# Patient Record
Sex: MALE | Race: WHITE | Employment: OTHER | URBAN - METROPOLITAN AREA
[De-identification: names, ages, dates, MRNs, and addresses within clinical notes are randomized per-mention and may not be internally consistent; named-entity substitution may affect disease eponyms.]

---

## 2020-03-04 ENCOUNTER — HOSPITAL ENCOUNTER (OUTPATIENT)
Dept: PHYSICAL THERAPY | Age: 55
Discharge: HOME | End: 2020-03-04
Payer: OTHER GOVERNMENT

## 2020-03-04 PROCEDURE — 97161 PT EVAL LOW COMPLEX 20 MIN: CPT

## 2020-03-04 PROCEDURE — 97140 MANUAL THERAPY 1/> REGIONS: CPT

## 2020-03-04 PROCEDURE — 97110 THERAPEUTIC EXERCISES: CPT

## 2020-03-04 NOTE — PROGRESS NOTES
Giovani Car  : 1965  Primary: [facility]  Secondary:  Therapy Center at [address], [place], [address] [place] [address]  [phone]   [phone]      OUTPATIENT PHYSICAL THERAPY: Daily Treatment Note 3/4/2020  ICD-10: Treatment Diagnosis:   Lumbago with Sciatica Right side (M54.41)  Pain in left hip (M25.552)  Pain in right hip (M25.551)      Precautions: Congenital dyplasia bilateral hips  Allergies: Patient has no known allergies. TREATMENT PLAN:  Effective Dates: 3/4/2020 TO 5/3/2020 (60 days). 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.89]  Pain in right hip [M25.551]  Pain in left hip [M25.552]   DATE OF ONSET: Progressive symptoms for 20 years, worst the last year  REFERRING PHYSICIAN: Stephen Sage MD MD Orders: Evaluate and Treat   Return MD Appointment: Not scheduled       Pre-treatment Symptoms/Complaints:  Mr. Amos Juarez is a 49 y.o. male presenting to physical therapy with complaints of low back and bilateral hip pain right greater than left. Patient states that pain is limiting activity in all positions. Pain: Initial: Pain Intensity 1: 9  Pain Location 1: Back, Hip  Pain Orientation 1: Right, Lower, Left  Pain Intervention(s) 1: Exercise  Post Session:  3/10   Medications Last Reviewed:  3/4/2020  Updated Objective Findings: See Initial Eval for more details. TREATMENT:   THERAPEUTIC EXERCISE: (15 minutes):  Exercises per grid below to improve mobility, strength, balance, and dynamic movement of back - generalized to improve functional bending, lifting, carrying, reaching, and overhead activites. Required moderate visual, verbal, and tactile cues to promote proper body alignment, promote proper body posture, promote proper body mechanics, and promote proper body breathing techniques.   Progressed resistance, range, repetitions, and complexity of movement as indicated. Date:  3/4/2020 Date:   Date:     Activity/Exercise Parameters Parameters Parameters   HEP HEP, POC, PT goals, anatomy/pathology     Nustep      Calf stretch wedge      Lumbar rotation      Hamstring stretch [id number]     Piriformis stretch [id number]     IT Band stretch [id number]     Hip ER stretch [id number]     TA activation [id number]                   Time spent with patient on correct form and muscle recruitment. MANUAL THERAPY: (8 minutes): Joint mobilization, Soft tissue mobilization was utilized and necessary because of the patient's restricted joint motion and restricted motion of soft tissue mobility. Date  3/4/2020    Technique Used Grade  Level # Time(s) Effect while being performed   Central posterior anterior III IV L2-5 3x6 each Mild tenderness          Muscle energy technique  For anterior rotation right innominate    Improved gait and decreased pain       MODALITIES: (0 minutes):      None Today     HEP: As above; handouts given to patient for all exercises. Treatment/Session Summary:    · Response to Treatment:  Pt demonstrated understanding of POC and initial HEP. No increase in pain or adverse reactions. · Communication/Consultation:  Patient was instructed in HEP, Plan of care, Physical Therapy goals, anatomy/pathology  · Equipment provided today:  None today  · Recommendations/Intent for next treatment session: Next visit will focus on Manual Therapy Core Stability Quad strengthening Hip strengthening soft tissue mobilization.     Total Treatment Billable Duration:  48 minutes: 25 evaluation, 15 Therapeutic exercise, 8 Manual Therapy   PT Patient Time In/Time Out  Time In: 1104  Time Out: 124 [facility], PT

## 2020-03-04 NOTE — THERAPY EVALUATION
Giovani Car  : 1965  Primary: [facility]  Secondary:  Therapy Center at [address], [address] [place] [address] Street  [phone]   [phone]          OUTPATIENT PHYSICAL THERAPY:Initial Assessment 3/4/2020      ICD-10: Treatment Diagnosis:   Lumbago with Sciatica Right side (M54.41)  Pain in left hip (M25.552)  Pain in right hip (M25.551)         Precautions: Congenital dyplasia bilateral hips  Allergies: Patient has no known allergies. TREATMENT PLAN:  Effective Dates: 3/4/2020 TO 5/3/2020 (60 days). 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.89]  Pain in right hip [M25.551]  Pain in left hip [M25.552]   DATE OF ONSET: Progressive symptoms for 20 years, worst the last year  REFERRING PHYSICIAN: Stephen Sage MD MD Orders: Evaluate and Treat   Return MD Appointment: Not scheduled     INITIAL ASSESSMENT:  Mr. Amos Juarez is a 49 y.o. male presenting to physical therapy with complaints of low back and bilateral hip pain right greater than left. Patient states that pain is limiting activity in all positions. Patient is motivated to decrease pain and improve overall mobility. Patient presents with increased pain, decreased strength, decreased ROM, decreased flexibility, decreased activity tolerance, and overall impaired functional mobility. Patient is a good candidate for skilled intervention with services to include manual therapy, modalities as needed, therapeutic exercises, and activity modification. PROBLEM LIST (Impacting functional limitations):  1. Decreased Strength  2. Decreased ADL/Functional Activities  3. Decreased Transfer Abilities  4. Decreased Ambulation Ability/Technique  5. Decreased Balance  6. Increased Pain  7. Decreased Activity Tolerance  8. Increased Fatigue  9.  Increased Shortness of Breath  10. Decreased Flexibility/Joint Mobility  11. Decreased [place] with Home Exercise Program INTERVENTIONS PLANNED:  1. Balance Exercise  2. Bed Mobility  3. Cold  4. Cryotherapy  5. Electrical Stimulation  6. Family Education  7. Gait Training  8. Heat  9. Home Exercise Program (HEP)  10. Manual Therapy  11. Neuromuscular Re-education/Strengthening  12. Range of Motion (ROM)  13. Therapeutic Activites  14. Therapeutic Exercise/Strengthening  15. Transfer Training  16. Lumbar Traction     GOALS: (Goals have been discussed and agreed upon with patient.)     Short-Term Functional Goals: Time Frame: 3/4/2020 to 4/3/2020 (30 days)  Goal Met   1. Dannie Alcazar will be independent with HEP 1.  [] Date:   2. Dannie Alcazar will participate in LE stretching program to increase flexibility    2. [] Date:   3. Dannie Alcazar will participate in core stabilization exercises to help with stabilization during ADLs 3. [] Date:   4. Dannie Alcazar will participate in LE strengthening program with weights/resistance as appropriate to help with gait and elevations 4. [] Date:   5. Dannie Alcazar will be educated in and demonstrate proper squat lift technique in order to reduce stress on spine and pelvis, improve safety, and reduce risk of injury. 5.  [] Date:   6. Dannie Alcazar will tolerate manual therapy/joint mobilizations/soft tissue to increase ROM and decrease pain  6. [] Date:              Discharge Goals: Time Frame: 3/4/2020 to 5/3/2020 (60 days) Goal Met   1. Dannie Alcazar will improve pain to 1-2/10 for improved tolerance to sleeping greater than 6 hours 1. [] Date:   2. Dannie Alcazar will demonstrate 4+ to5/5 LE strength on manual muscle testing for ability to perform yard work required around the home 2. [] Date:   3. Dannie Alcazar will improve lumbar flexion ROM to 70 degrees for increased ability to perform bending, lifting and squating 3.   [] Date:   Willi Orozco Bartolo Ledesma will improve Modified Oswestry Low Back Pain Questionnaire to 10/50 to demonstrate increased tolerance to activity. 4.  [] Date:          Outcome Measure: Tool Used: Modified Oswestry Low Back Pain Questionnaire  Score:  Initial: 22/50  Most Recent: X/50 (Date: -- )   Interpretation of Score: Each section is scored on a 0-5 scale, 5 representing the greatest disability. The scores of each section are added together for a total score of 50. Medical Necessity:   ·    Skilled intervention continues to be required due to above deficits affecting participation in basic ADLs and overall functional tolerance. · Patient demonstrates capacity to improve ROM and strength which will increase independence, increase safety, and allow for return to previous activity level. · Patient demonstrates excellent rehab potential due to higher previous functional level. Reason for Services/Other Comments:  · Patient requires skilled intervention due to modification of therapeutic interventions to increase complexity of exercises . Total Evaluation Duration: 25 minutes     Rehabilitation Potential For Stated Goals: Excellent  Regarding [address] [place] [address] Road therapy, I certify that the treatment plan above will be carried out by a therapist or under their direction. Thank you for this referral,  Thomas Waggoner, PT MSR  Referring Physician Signature: Katie Lynch MD              Date                    HISTORY:   History of Present Injury/Illness (Reason for Referral):    Mr. Alysia Beyer is a 49 y.o. male presenting to physical therapy with complaints of low back and bilateral hip pain right greater than left.  Patient states that pain is limiting activity in all positions.     -Present symptoms/complaints (on day of evaluation)  Pain Scale:  · Current: 6/10  · Best: 4/10  · Worst: 9/10      · Aggravating factors: Standing, Walking, sitting, bending, Lifting and Sleeping   · Relieving factors: Rest and Meds  · Irritability: Medium (Onset of pain is equal to alleviation)      Past Medical History/Comorbidities:   Mr. Evelia Baker  has a past medical history of Arthritis, Cancer ([facility] [address] and Unspecified sleep apnea. Mr. Evelia Baker  has a past surgical history that includes hx orthopaedic (2002). 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 Patient denies any saddle paresthesia or bowel/bladder deficits. Current Medications:    Current Outpatient Medications:     HYDROcodone-acetaminophen (LORTAB) 7.5-500 mg per tablet, Take 1 Tab by mouth every four (4) hours as needed for Pain., Disp: 25 Tab, Rfl: 0      Ambulatory/Rehab Services H2 Model Falls Risk Assessment    Risk Factors:       (1)  Gender [Male] Ability to Rise from Chair:       (1)  Pushes up, successful in one attempt    Falls Prevention Plan:       No modifications necessary   Total: (5 or greater = High Risk): 2    ©2010 [facility] [employer]. All Rights Reserved. [facility] States Patent #[id number].  Federal Law prohibits the replication, distribution or use without written permission from [facility] [employer]       Date Last Reviewed:  3/4/2020   Number of Personal Factors/Comorbidities that affect the Plan of Care: 0: LOW COMPLEXITY   EXAMINATION:   Observation/Orthostatic Postural Assessment:           Decreased Lumbar Lordosis and Right Lateral Shift  Palpation:          Increased Tenderness with mild palpation of  Spinous process L2-5 and anterior right hip     ROM:            AROM/PROM         Joint: Eval Date: 3/4/2020  Re-Assess Date:  Re-Assess Date:    Active ROM RIGHT LEFT RIGHT LEFT RIGHT LEFT   Knee Extension WNL WNL       Knee Flexion WNL WNL       Hip Flexion 93 105       Hip Abduction 25 34       Hip External rotation 35 55       Hip Internal rotation 5 18       Lumbar Flexion 50        Lumbar Extension 13          Repeated Motion:  Direction    Frequency Symptoms Prior Symptons Post   Flexion Repeated 10 times  Reproduced Symptoms   Extension Repeated 10 times  Reproduced Symptoms         Strength:     Eval Date: 3/4/2020  Re-Assess Date:  Re-Assess Date:      RIGHT LEFT RIGHT LEFT RIGHT LEFT   Knee Flexion (L5-S2)   4+/5  5/5           Knee Extension (L3, L4)  5/5  5/5           Hip Flexion (L1, L2)  4/5  4+/5           Hip Extension  4/5  4+/5           Hip Abduction (L5, S1)  4/5  4+/5           Ankle Dorsiflexion (L4)  5/5  5/5           Great Toe Extension (L5)  5/5  5/5           Ankle Plantar Flexion (S1-S2)  5/5  5/5               Special Tests:  SLR: Negative  SLUMP: Negative  Scouring:Positive for impingement pain  Fabers:Positive        Manual:   Eval Date: 3/4/2020   Reasess Date:      Joint/Area Directon Grade Treatment Effect Joint Direction Grade Treatment Effect   Lumbar 2-5 PA III and IV Improved Symptoms post treatment           Right hip Distraction III Improved Symptoms post treatment                           Neurological Screen:    RADIATING SYMPTOMS?: No                  Reflex Testing:      Upper motor [place] screen         Clonus: Negative         Babinski: Negative    Functional Mobility:  Affecting participation in basic ADLs and functional tasks.     Balance:     Single Leg Stance: R= <15 seconds L= <30 seconds     Body Structures Involved:  1. Bones  2. Joints  3. Muscles  4. Ligaments Body Functions Affected:  1. Sensory/Pain  2. Neuromusculoskeletal  3. Movement Related Activities and Participation Affected:  1. Mobility  2.  Self Care   Number of elements that affect the Plan of Care: 1-2: LOW COMPLEXITY   CLINICAL PRESENTATION:   Presentation: Stable and uncomplicated: LOW COMPLEXITY   CLINICAL DECISION MAKING:      Use of outcome tool(s) and clinical judgement create a POC that gives a: Clear prediction of patient's progress: LOW COMPLEXITY     See associated treatment note for treatment provided today      Yasmeen Robles PT

## 2020-03-05 ENCOUNTER — HOSPITAL ENCOUNTER (OUTPATIENT)
Dept: PHYSICAL THERAPY | Age: 55
Discharge: HOME | End: 2020-03-05
Payer: OTHER GOVERNMENT

## 2020-03-05 PROCEDURE — 97014 ELECTRIC STIMULATION THERAPY: CPT

## 2020-03-05 PROCEDURE — 97140 MANUAL THERAPY 1/> REGIONS: CPT

## 2020-03-05 PROCEDURE — 97110 THERAPEUTIC EXERCISES: CPT

## 2020-03-05 NOTE — PROGRESS NOTES
Rosi Forman  : 1965  Primary: [facility]  Secondary:  Therapy Center at [address], [place], [address] [place] [address]  [phone]   [phone]      OUTPATIENT PHYSICAL THERAPY: Daily Treatment Note 3/5/2020  ICD-10: Treatment Diagnosis:   Lumbago with Sciatica Right side (M54.41)  Pain in left hip (M25.552)  Pain in right hip (M25.551)      Precautions: Congenital dyplasia bilateral hips  Allergies: Patient has no known allergies. TREATMENT PLAN:  Effective Dates: 3/4/2020 TO 5/3/2020 (60 days). 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.89]  Pain in right hip [M25.551]  Pain in left hip [M25.552]   DATE OF ONSET: Progressive symptoms for 20 years, worst the last year  REFERRING PHYSICIAN: David Dior MD MD Orders: Evaluate and Treat   Return MD Appointment: Not scheduled       Pre-treatment Symptoms/Complaints:  Pt. continues to complain of bilateral low back pain that radiates down both LE's    Pain: Initial: Pain Intensity 1: 5  Pain Location 1: Back, Hip  Pain Orientation 1: Left, Lower, Right  Pain Intervention(s) 1: Exercise  Post Session:  0 /10 no pain   Medications Last Reviewed:  3/5/2020  Updated Objective Findings: Pt. presented moderate antalgic gait entering clinic today. TREATMENT:   THERAPEUTIC EXERCISE: (30 minutes):  Exercises per grid below to improve mobility, strength, balance, and dynamic movement of back - generalized to improve functional bending, lifting, carrying, reaching, and overhead activites. Required moderate visual, verbal, and tactile cues to promote proper body alignment, promote proper body posture, promote proper body mechanics, and promote proper body breathing techniques. Progressed resistance, range, repetitions, and complexity of movement as indicated. Date:  3/4/2020 Date:  3/5/20  Date:     Activity/Exercise Parameters Parameters Parameters   HEP HEP, POC, PT goals, anatomy/pathology     Nustep  X 10 mins level 5    Calf stretch wedge  4x30 sec hold     Lumbar rotation  X 10 reps x 10 sec hold     Hamstring stretch [id number] Strap 4x30 sec hold     Piriformis stretch [id number] 4x30 sec hold     IT Band stretch [id number] Strap 4x30 sec     Hip ER stretch [id number]     TA activation [id number]     SKTC  4x30 sec hold BLE            Time spent with patient on correct form and muscle recruitment. MANUAL THERAPY: (15 minutes): Joint mobilization, Soft tissue mobilization was utilized and necessary because of the patient's restricted joint motion and restricted motion of soft tissue mobility. Pt. Received soft tissue mobilization to decrease pain and tightness in bilateral lumbosacral paraspinals in prone. Date  3/5/2020    Technique Used Grade  Level # Time(s) Effect while being performed   Central posterior anterior III IV L2-5 3x6 each Mild tenderness          Muscle energy technique  For anterior rotation right innominate    Improved gait and decreased pain       MODALITIES: (15 minutes):      Pt. received IFC estim and moist hot pack to bilateral lumbosacral paraspinals in prone x 15 mins to decrease pain and muscle tightness. HEP: As above; handouts given to patient for all exercises. Treatment/Session Summary:    · Response to Treatment:  Pt. was compliant with all exercises and reported no pain at the end of session today. · Communication/Consultation:  Patient was instructed in HEP, Plan of care, Physical Therapy goals, anatomy/pathology  · Equipment provided today:  None today  · Recommendations/Intent for next treatment session: Next visit will focus on Manual Therapy Core Stability Quad strengthening Hip strengthening soft tissue mobilization.     Total Treatment Billable Duration: 60 mins   PT Patient Time In/Time Out  Time In: 1330  Time Out: [address]

## 2020-03-09 ENCOUNTER — HOSPITAL ENCOUNTER (OUTPATIENT)
Dept: PHYSICAL THERAPY | Age: 55
Discharge: HOME | End: 2020-03-09
Payer: OTHER GOVERNMENT

## 2020-03-09 PROCEDURE — 97110 THERAPEUTIC EXERCISES: CPT

## 2020-03-09 PROCEDURE — 97014 ELECTRIC STIMULATION THERAPY: CPT

## 2020-03-09 PROCEDURE — 97140 MANUAL THERAPY 1/> REGIONS: CPT

## 2020-03-09 NOTE — PROGRESS NOTES
Charmian Saint  : 1965  Primary: [facility]  Secondary:  Therapy Center at [address], [place], [address] [place] [address]  [phone]   [phone]      OUTPATIENT PHYSICAL THERAPY: Daily Treatment Note 3/9/2020  ICD-10: Treatment Diagnosis:   Lumbago with Sciatica Right side (M54.41)  Pain in left hip (M25.552)  Pain in right hip (M25.551)      Precautions: Congenital dyplasia bilateral hips  Allergies: Patient has no known allergies. TREATMENT PLAN:  Effective Dates: 3/4/2020 TO 5/3/2020 (60 days). 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.89]  Pain in right hip [M25.551]  Pain in left hip [M25.552]   DATE OF ONSET: Progressive symptoms for 20 years, worst the last year  REFERRING PHYSICIAN: Fartun Hermosillo MD MD Orders: Evaluate and Treat   Return MD Appointment: Not scheduled       Pre-treatment Symptoms/Complaints:  Pt. reported bilateral low back and hip pain that was primarily in his left hip today    Pain: Initial: Pain Intensity 1: 5  Pain Location 1: Back, Hip  Pain Orientation 1: Lower, Left, Right  Pain Intervention(s) 1: Cold pack, Exercise  Post Session:  0 /10 no pain   Medications Last Reviewed:  3/9/2020  Updated Objective Findings: Pt. continues to ambulate with moderate antalgic gait   TREATMENT:   THERAPEUTIC EXERCISE: (30 minutes):  Exercises per grid below to improve mobility, strength, balance, and dynamic movement of back - generalized to improve functional bending, lifting, carrying, reaching, and overhead activites. Required moderate visual, verbal, and tactile cues to promote proper body alignment, promote proper body posture, promote proper body mechanics, and promote proper body breathing techniques. Progressed resistance, range, repetitions, and complexity of movement as indicated. Date:  3/4/2020 Date:  3/5/20  Date:  3/9/20   Activity/Exercise Parameters Parameters Parameters   Nustep  X 10 mins level 5 X 10 mins level 6    Calf stretch wedge  4x30 sec hold  [id number] hold    Lumbar rotation  X 10 reps x 10 sec hold  X 10 reps x 10 sec hold    Hamstring stretch [id number] Strap 4x30 sec hold  Strap 4x30 sec hold BLE's    Piriformis stretch [id number] 4x30 sec hold  4x30 sec hold    IT Band stretch [id number] Strap 4x30 sec  Strap 4x30 sec hol    Hip ER stretch [id number]  3x30 sec hold    TA activation [id number]  15x5 sec hold    SKTC  4x30 sec hold BLE 4x30 sec hold BLE's            Time spent with patient on correct form and muscle recruitment. MANUAL THERAPY: (15 minutes): Joint mobilization, Soft tissue mobilization was utilized and necessary because of the patient's restricted joint motion and restricted motion of soft tissue mobility. Pt. Received soft tissue mobilization to decrease pain and tightness in bilateral lumbosacral paraspinals in prone. Date  3/9/2020    Technique Used Grade  Level # Time(s) Effect while being performed   Central posterior anterior III IV L2-5 3x6 each Mild tenderness          Muscle energy technique  For anterior rotation right innominate    Improved gait and decreased pain       MODALITIES: (15 minutes):      Pt. received IFC estim and moist hot pack to bilateral lumbosacral paraspinals in prone x 15 mins to decrease pain and muscle tightness. HEP: As above; handouts given to patient for all exercises. Treatment/Session Summary:    · Response to Treatment:  Pt. was compliant with all exercises and reported no pain at the end of session today.   · Communication/Consultation:  Patient was instructed in HEP, Plan of care, Physical Therapy goals, anatomy/pathology  · Equipment provided today:  None today  · Recommendations/Intent for next treatment session: Next visit will focus on Manual Therapy Core Stability Quad strengthening Hip strengthening soft tissue mobilization.     Total Treatment Billable Duration: 60 mins   PT Patient Time In/Time Out  Time In: 1330  Time Out: Melidae [address], PTA

## 2020-03-12 ENCOUNTER — HOSPITAL ENCOUNTER (OUTPATIENT)
Dept: PHYSICAL THERAPY | Age: 55
Discharge: HOME | End: 2020-03-12
Payer: OTHER GOVERNMENT

## 2020-03-12 PROCEDURE — 97140 MANUAL THERAPY 1/> REGIONS: CPT

## 2020-03-12 PROCEDURE — 97110 THERAPEUTIC EXERCISES: CPT

## 2020-03-12 NOTE — PROGRESS NOTES
Jaelyn Cabral  : 1965  Primary: [facility]  Secondary:  [facility] at [address], [address]  [phone]   [phone]      OUTPATIENT PHYSICAL THERAPY: Daily Treatment Note 3/12/2020  ICD-10: Treatment Diagnosis:   Lumbago with Sciatica Right side (M54.41)  Pain in left hip (M25.552)  Pain in right hip (M25.551)      Precautions: Congenital dyplasia bilateral hips  Allergies: Patient has no known allergies. TREATMENT PLAN:  Effective Dates: 3/4/2020 TO 5/3/2020 (60 days). 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.89]  Pain in right hip [M25.551]  Pain in left hip [M25.552]   DATE OF ONSET: Progressive symptoms for 20 years, worst the last year  REFERRING PHYSICIAN: King Jose MD MD Orders: Evaluate and Treat   Return MD Appointment: Not scheduled       Pre-treatment Symptoms/Complaints:  Patient reports achy pain across low back and bilateral hips    Pain: Initial: Pain Intensity 1: 6  Pain Location 1: Back, Hip  Pain Orientation 1: Lower, Right, Left  Pain Intervention(s) 1: Exercise  Post Session:  0 /10 no pain   Medications Last Reviewed:  3/12/2020  Updated Objective Findings: Anterior rotation right innominate   TREATMENT:   THERAPEUTIC EXERCISE: (40 minutes):  Exercises per grid below to improve mobility, strength, balance, and dynamic movement of back - generalized to improve functional bending, lifting, carrying, reaching, and overhead activites. Required moderate visual, verbal, and tactile cues to promote proper body alignment, promote proper body posture, promote proper body mechanics, and promote proper body breathing techniques. Progressed resistance, range, repetitions, and complexity of movement as indicated.      Date:  3/12/2020 Date:  3/5/20  Date:  3/9/20 Activity/Exercise Parameters Parameters Parameters   Nustep X 10 mins level 5 X 10 mins level 5 X 10 mins level 6    Calf stretch wedge 4x30 sec hold 4x30 sec hold  [id number] hold    Lumbar rotation X 10 reps x 10 sec hold X 10 reps x 10 sec hold  X 10 reps x 10 sec hold    Hamstring stretch [id number] Strap 4x30 sec hold  Strap 4x30 sec hold BLE's    Piriformis stretch 8b78hbo 4x30 sec hold  4x30 sec hold    IT Band stretch 6f13ycx strap Strap 4x30 sec  Strap 4x30 sec hol    Hip ER stretch 6t93lve  3x30 sec hold    TA activation [id number]  15x5 sec hold    SKTC  4x30 sec hold BLE 4x30 sec hold BLE's    Bridging with abduction 3x10 green     Hooklying Adduction [id number]      Clam  Right left 2x10 green     Sidelying Abduction  Right left 2x10 green     Prone extension  Right left 2x10 green       Time spent with patient on correct form and muscle recruitment. MANUAL THERAPY: (13 minutes): Joint mobilization, Soft tissue mobilization was utilized and necessary because of the patient's restricted joint motion and restricted motion of soft tissue mobility. Pt. Received soft tissue mobilization to decrease pain and tightness in bilateral lumbosacral paraspinals in prone. Date  3/12/2020    Technique Used Grade  Level # Time(s) Effect while being performed   Central posterior anterior III IV L2-5 3x6 each Mild tenderness   Rotation   Lumbar 4min    Muscle energy technique  For anterior rotation right innominate   4 min Improved gait and decreased pain       MODALITIES: ( 5 minutes):      *  Cold Pack Therapy in order to provide analgesia. No charge     HEP: As above; handouts given to patient for all exercises. Treatment/Session Summary:    · Response to Treatment:  Patient reported less pain and improved mobility post treatment, equal pelvic landmarks.   · Communication/Consultation:  None Today  · Equipment provided today:  None today  · Recommendations/Intent for next treatment session: Next visit will focus on Manual Therapy Core Stability Quad strengthening Hip strengthening soft tissue mobilization.     Total Treatment Billable Duration: 53 mins   PT Patient Time In/Time Out  Time In: 0703  Time Out: 1090 [facility],

## 2020-03-17 ENCOUNTER — HOSPITAL ENCOUNTER (OUTPATIENT)
Dept: PHYSICAL THERAPY | Age: 55
Discharge: HOME | End: 2020-03-17
Payer: OTHER GOVERNMENT

## 2020-03-17 PROCEDURE — 97110 THERAPEUTIC EXERCISES: CPT

## 2020-03-17 NOTE — PROGRESS NOTES
Coty Arellano  : 1965  Primary: [facility]  Secondary:  [facility]rapy [facility] at [address], [address] [place] [address]  [phone]   [phone]      OUTPATIENT PHYSICAL THERAPY: Daily Treatment Note 3/17/2020  ICD-10: Treatment Diagnosis:   Lumbago with Sciatica Right side (M54.41)  Pain in left hip (M25.552)  Pain in right hip (M25.551)      Precautions: Congenital dyplasia bilateral hips  Allergies: Patient has no known allergies. TREATMENT PLAN:  Effective Dates: 3/4/2020 TO 5/3/2020 (60 days). 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.89]  Pain in right hip [M25.551]  Pain in left hip [M25.552]   DATE OF ONSET: Progressive symptoms for 20 years, worst the last year  REFERRING PHYSICIAN: Roberto Gutiérrez MD MD Orders: Evaluate and Treat   Return MD Appointment: Not scheduled       Pre-treatment Symptoms/Complaints:  Patient reports improved stability and less AM pain    Pain: Initial: Pain Intensity 1: 4  Pain Location 1: Back, Hip  Pain Orientation 1: Lower, Right, Left  Pain Intervention(s) 1: Exercise  Post Session:  0 /10 no pain   Medications Last Reviewed:  3/17/2020  Updated Objective Findings: Equal pelvic landmarks   TREATMENT:   THERAPEUTIC EXERCISE: (53 minutes):  Exercises per grid below to improve mobility, strength, balance, and dynamic movement of back - generalized to improve functional bending, lifting, carrying, reaching, and overhead activites. Required moderate visual, verbal, and tactile cues to promote proper body alignment, promote proper body posture, promote proper body mechanics, and promote proper body breathing techniques. Progressed resistance, range, repetitions, and complexity of movement as indicated.      Date:  3/12/2020 Date:  3/17/20  Date:  3/9/20   Activity/Exercise Parameters Parameters Parameters   Nustep X 10 mins level 5 X 10 mins level 5 X 10 mins level 6    Calf stretch wedge 4x30 sec hold 4x30 sec hold  5x98gwc hold    Lumbar rotation X 10 reps x 10 sec hold X 10 reps x 10 sec hold  X 10 reps x 10 sec hold    Hamstring stretch [id number] Strap 4x30 sec hold  Strap 4x30 sec hold BLE's    Piriformis stretch 5w20mly 4x30 sec hold  4x30 sec hold    IT Band stretch 3p72dgh strap Strap 4x30 sec  Strap 4x30 sec hol    Hip ER stretch 7u45oji  3x30 sec hold    TA activation 20w2mca 64n0kyt 15x5 sec hold    SKTC   4x30 sec hold BLE's    Sit to Stand   With pad 3x10    3 way hip swing right left  2x10 each    Single leg balance right left  0h83pdy each    Bridging with abduction 3x10 green 3x10 blue    Hooklying Adduction 60i9kam  39u6wgl    Clam  Right left 2x10 green 2x10 blue    Sidelying Abduction  Right left 2x10 green 2x10 blue    Prone extension  Right left 2x10  2x10       Time spent with patient on correct form and muscle recruitment. MANUAL THERAPY: (3 minutes): Joint mobilization, Soft tissue mobilization was utilized and necessary because of the patient's restricted joint motion and restricted motion of soft tissue mobility. Pt. Received soft tissue mobilization to decrease pain and tightness in bilateral lumbosacral paraspinals in prone. Date  3/17/2020    Technique Used Grade  Level # Time(s) Effect while being performed   Central posterior anterior with prone III IV L2-5 3x6 each Mild tenderness   Rotation   Lumbar None    Muscle energy technique  For anterior rotation right innominate   None Improved gait and decreased pain       MODALITIES: ( 0 minutes):      None today     HEP: As above; handouts given to patient for all exercises. Treatment/Session Summary:    · Response to Treatment:  Patient progressing well as indicated by improved tolerance to activity.   · Communication/Consultation:  None Today  · Equipment provided today:  None today  · Recommendations/Intent for next treatment session: Next visit will focus on Manual Therapy Core Stability Quad strengthening Hip strengthening soft tissue mobilization.     Total Treatment Billable Duration: 56 mins   PT Patient Time In/Time Out  Time In: 0700  Time Out: 8109 Stan Breaux PT

## 2020-03-18 NOTE — PROGRESS NOTES
Patient has been notified that all appointments are cancelled effective 3/19/2020 until at least 4/1/2020 due to COVID-19 state mandates.     Michael Carter, DPT

## 2020-03-19 ENCOUNTER — HOSPITAL ENCOUNTER (OUTPATIENT)
Dept: PHYSICAL THERAPY | Age: 55
Discharge: HOME | End: 2020-03-19
Payer: OTHER GOVERNMENT

## 2020-03-24 ENCOUNTER — APPOINTMENT (OUTPATIENT)
Dept: PHYSICAL THERAPY | Age: 55
End: 2020-03-24
Payer: OTHER GOVERNMENT

## 2020-03-26 ENCOUNTER — APPOINTMENT (OUTPATIENT)
Dept: PHYSICAL THERAPY | Age: 55
End: 2020-03-26
Payer: OTHER GOVERNMENT

## 2020-03-31 ENCOUNTER — HOSPITAL ENCOUNTER (OUTPATIENT)
Dept: PHYSICAL THERAPY | Age: 55
End: 2020-03-31
Payer: OTHER GOVERNMENT

## 2020-04-02 ENCOUNTER — HOSPITAL ENCOUNTER (OUTPATIENT)
Dept: PHYSICAL THERAPY | Age: 55
End: 2020-04-02

## 2020-04-06 NOTE — PROGRESS NOTES
Unable to do virtual visits at this time for patient's remaining visits have been cancelled.     Shayy Spangler, NETTAT

## 2020-04-07 ENCOUNTER — HOSPITAL ENCOUNTER (OUTPATIENT)
Dept: PHYSICAL THERAPY | Age: 55
Discharge: HOME | End: 2020-04-07

## 2020-04-09 ENCOUNTER — APPOINTMENT (OUTPATIENT)
Dept: PHYSICAL THERAPY | Age: 55
End: 2020-04-09

## 2020-04-14 ENCOUNTER — APPOINTMENT (OUTPATIENT)
Dept: PHYSICAL THERAPY | Age: 55
End: 2020-04-14

## 2020-04-14 NOTE — THERAPY DISCHARGE
Saskia Bay  : 1965  Primary: [facility]  Secondary:  Therapy Center at [address], Deondre quezada, [address] [place] [address] Street  [phone]   [phone]          OUTPATIENT PHYSICAL THERAPY:Discontinuation Summary 2020      ICD-10: Treatment Diagnosis:   Lumbago with Sciatica Right side (M54.41)  Pain in left hip (M25.552)  Pain in right hip (M25.551)         Precautions: Congenital dyplasia bilateral hips  Allergies: Patient has no known allergies. TREATMENT PLAN:  Effective Dates: 3/4/2020 TO 5/3/2020 (60 days). Frequency/Duration: Discharge MEDICAL/REFERRING DIAGNOSIS:  Lumbago with sciatica, right side [M54.41]  Other specified congenital deformities of hip [Q65.89]  Pain in right hip [M25.551]  Pain in left hip [M25.552]   DATE OF ONSET: Progressive symptoms for 20 years, worst the last year  REFERRING PHYSICIAN: Bernard Olmedo MD MD Orders: Evaluate and Treat   Return MD Appointment: Not scheduled     INITIAL ASSESSMENT:  Mr. Christen Ventura is a 49 y.o. male presenting to physical therapy with complaints of low back and bilateral hip pain right greater than left. Patient states that pain is limiting activity in all positions. Patient is motivated to decrease pain and improve overall mobility. Patient presents with increased pain, decreased strength, decreased ROM, decreased flexibility, decreased activity tolerance, and overall impaired functional mobility. Patient is a good candidate for skilled intervention with services to include manual therapy, modalities as needed, therapeutic exercises, and activity modification. DISCONTINUATION: Saskia Bay has been seen in physical therapy from 3/4/2020 to 3/17/2020 for 5 visits. Treatment has been discontinued at this time due to state mandates on the COVID-19 virus. The below goals were met prior to discontinuation.    Some goals were not met due to being unable to re-assess   Thank you for this referral.           PROBLEM LIST (Impacting functional limitations):  1. Decreased Strength  2. Decreased ADL/Functional Activities  3. Decreased Transfer Abilities  4. Decreased Ambulation Ability/Technique  5. Decreased Balance  6. Increased Pain  7. Decreased Activity Tolerance  8. Increased Fatigue  9. Increased Shortness of Breath  10. Decreased Flexibility/Joint Mobility  11. Decreased [place] with Home Exercise Program INTERVENTIONS PLANNED:  1. Balance Exercise  2. Bed Mobility  3. Cold  4. Cryotherapy  5. Electrical Stimulation  6. Family Education  7. Gait Training  8. Heat  9. Home Exercise Program (HEP)  10. Manual Therapy  11. Neuromuscular Re-education/Strengthening  12. Range of Motion (ROM)  13. Therapeutic Activites  14. Therapeutic Exercise/Strengthening  15. Transfer Training  16. Lumbar Traction     GOALS: (Goals have been discussed and agreed upon with patient.)     Short-Term Functional Goals: Time Frame: 3/4/2020 to 4/3/2020 (30 days)  Goal Met   1. [place] Shouts will be independent with HEP 1. [x] Date: 3/17/2020   2. [place] Sh[place] will participate in LE stretching program to increase flexibility    2. [x] Date:   3. [place] Sh[place] will participate in core stabilization exercises to help with stabilization during ADLs 3. [x] Date:   4. [place] Shouts will participate in LE strengthening program with weights/resistance as appropriate to help with gait and elevations 4. [x] Date:   5. [place] Shouts will be educated in and demonstrate proper squat lift technique in order to reduce stress on spine and pelvis, improve safety, and reduce risk of injury. 5.  [x] Date:   6. [place] Shouts will tolerate manual therapy/joint mobilizations/soft tissue to increase ROM and decrease pain  6. [x] Date:              Discharge Goals: Time Frame: 3/4/2020 to 5/3/2020 (60 days) Goal Met   1.  [place] will improve pain to 1-2/10 for improved tolerance to sleeping greater than 6 hours 1. [] Date:   2. Dannie Alcazar will demonstrate 4+ to5/5 LE strength on manual muscle testing for ability to perform yard work required around the home 2. [] Date:   3. Dannie Alcazar will improve lumbar flexion ROM to 70 degrees for increased ability to perform bending, lifting and squating 3. [] Date:   4. Dannie Alcazar will improve Modified Oswestry Low Back Pain Questionnaire to 10/50 to demonstrate increased tolerance to activity. 4.  [] Date:          Outcome Measure: Tool Used: Modified Oswestry Low Back Pain Questionnaire  Score:  Initial: 22/50  Most Recent: X/50 (Date: -- )   Interpretation of Score: Each section is scored on a 0-5 scale, 5 representing the greatest disability. The scores of each section are added together for a total score of 50. Reason for Services/Other Comments:   Dannie Alcazar has been seen in physical therapy from 3/4/2020 to 3/17/2020 for 5 visits. Treatment has been discontinued at this time due to state mandates on the COVID-19 virus. The below goals were met prior to discontinuation. Some goals were not met due to being unable to re-assess   Thank you for this referral.          Regarding Nadine Morales therapy, I certify that the treatment plan above will be carried out by a therapist or under their direction.   Thank you for this referral,  Toni Mendoza, PT MSR

## 2020-05-11 ENCOUNTER — APPOINTMENT (OUTPATIENT)
Dept: PHYSICAL THERAPY | Age: 55
End: 2020-05-11

## 2020-05-11 ENCOUNTER — HOSPITAL ENCOUNTER (OUTPATIENT)
Dept: PHYSICAL THERAPY | Age: 55
Discharge: HOME | End: 2020-05-11
Payer: OTHER GOVERNMENT

## 2020-05-11 PROCEDURE — 97161 PT EVAL LOW COMPLEX 20 MIN: CPT

## 2020-05-11 PROCEDURE — 97140 MANUAL THERAPY 1/> REGIONS: CPT

## 2020-05-11 PROCEDURE — 97110 THERAPEUTIC EXERCISES: CPT

## 2020-05-11 NOTE — PROGRESS NOTES
Maria Teresa Lavonne  : 1965  Primary: [facility]  Secondary:  Therapy Center at [address], Deondre quezada, [address] [place] [address] Street  [phone]   [phone]      OUTPATIENT PHYSICAL THERAPY: Daily Treatment Note 2020  ICD-10: Treatment Diagnosis:   Lumbago with Sciatica Right side (M54.41)  Pain in left hip (M25.552)  Pain in right hip (M25.551)      Precautions: Congenital dyplasia bilateral hips  Allergies: Patient has no known allergies. TREATMENT PLAN:  Effective Dates: 2020 TO 2020 (60 days). 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.89]  Pain in right hip [M25.551]  Pain in left hip [M25.552]   DATE OF ONSET: Progressive symptoms for 20 years, worst the last year  REFERRING PHYSICIAN: Feliberto Espinosa MD MD Orders: Evaluate and Treat   Return MD Appointment: Not scheduled       Pre-treatment Symptoms/Complaints:  See Initial Eval Dated 2020 for more details. Pain: Initial: Pain Intensity 1: 8  Pain Location 1: Back, Hip  Pain Orientation 1: Lower, Right, Left  Pain Intervention(s) 1: Exercise, Cold pack  Post Session:  4 /10    Medications Last Reviewed:  2020  Updated Objective Findings: See Initial Eval for more details. TREATMENT:   THERAPEUTIC EXERCISE: (25 minutes):  Exercises per grid below to improve mobility, strength, balance, and dynamic movement of back - generalized to improve functional bending, lifting, carrying, reaching, and overhead activites. Required moderate visual, verbal, and tactile cues to promote proper body alignment, promote proper body posture, promote proper body mechanics, and promote proper body breathing techniques. Progressed resistance, range, repetitions, and complexity of movement as indicated.      Date:  3/12/2020 Date:  3/17/20  Date:  20 Activity/Exercise Parameters Parameters Parameters   Nustep X 10 mins level 5 X 10 mins level 5 ---   Calf stretch wedge 4x30 sec hold 4x30 sec hold  4o11apm hold    Lumbar rotation X 10 reps x 10 sec hold X 10 reps x 10 sec hold  X 10 reps x 10 sec hold    Hamstring stretch [id number] Strap 4x30 sec hold  Strap 3x30 sec hold BLE's    Piriformis stretch 7p12lxj 4x30 sec hold  3x30 sec hold    IT Band stretch 6h53pyu strap Strap 4x30 sec  Strap 3x30 sec hold   Hip ER stretch 1p78kuh  3x30 sec hold    TA activation 56h6sdw 14g3cmf 15x5 sec hold    SKTC   3x30 sec hold BLE's    Sit to Stand   With pad 3x10    3 way hip swing right left  2x10 each    Single leg balance right left  6e56qsh each    Hooklying knee fall out   3x10 Blue   Bridging with abduction 3x10 green 3x10 blue 3x10 Blue   Hooklying Adduction 81z4vif  12w6owu 20x5 sec   Clam  Right left 2x10 green 2x10 blue    Sidelying Abduction  Right left 2x10 green 2x10 blue    Prone extension  Right left 2x10  2x10       Time spent with patient on correct form and muscle recruitment. MANUAL THERAPY: (10 minutes): Joint mobilization, Soft tissue mobilization was utilized and necessary because of the patient's restricted joint motion and restricted motion of soft tissue mobility. Pt. Received soft tissue mobilization to decrease pain and tightness in bilateral lumbosacral paraspinals in prone. Date  5/11/2020    Technique Used Grade  Level # Time(s) Effect while being performed   Central posterior anterior with prone III IV L2-5 3min Mild tenderness   Rotation   Lumbar 3min    Muscle energy technique  For anterior rotation right innominate   4min Improved gait and decreased pain       MODALITIES: ( 0 minutes):      None today     HEP: As above; handouts given to patient for all exercises. Treatment/Session Summary:    · Response to Treatment:  Pt demonstrated understanding of POC and initial HEP.  No increase in pain or adverse reactions. · Communication/Consultation:  None Today  · Equipment provided today:  None today  · Recommendations/Intent for next treatment session: Next visit will focus on Manual Therapy Core Stability Quad strengthening Hip strengthening soft tissue mobilization.     Total Treatment Billable Duration: 55 minutes: 20 evaluation, 25 Therapeutic exercise, 10 Manual Therapy   PT Patient Time In/Time Out  Time In: 1330  Time Out: 96 Hina Candelario PT

## 2020-05-11 NOTE — THERAPY EVALUATION
Ja Miller  : 1965  Primary: [facility]  Secondary:  Therapy Center at [address], [place], [address] [place] [address]  [phone]   [phone]          OUTPATIENT PHYSICAL THERAPY:Initial Assessment 2020      ICD-10: Treatment Diagnosis:   Lumbago with Sciatica Right side (M54.41)  Pain in left hip (M25.552)  Pain in right hip (M25.551)         Precautions: Congenital dyplasia bilateral hips  Allergies: Patient has no known allergies. TREATMENT PLAN:  Effective Dates: 2020 TO 2020 (60 days). 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.89]  Pain in right hip [M25.551]  Pain in left hip [M25.552]   DATE OF ONSET: Progressive symptoms for 20 years, worst the last year  REFERRING PHYSICIAN: Candace Burnette MD MD Orders: Evaluate and Treat   Return MD Appointment: Not scheduled     INITIAL ASSESSMENT:  Mr. Sana Diaz is a 49 y.o. male presenting to physical therapy with complaints of low back and bilateral hip pain right greater than left. Patient is returning to physical therapy after having to be discharged secondary to COVID-19 mandates, he states that he was doing better until limited HEP secondary to work and changes in activity. Patient is motivated to decrease pain and improve overall mobility. Patient presents with increased pain, decreased strength, decreased ROM, decreased flexibility, decreased activity tolerance, and overall impaired functional mobility. Patient is a good candidate for skilled intervention with services to include manual therapy, modalities as needed, therapeutic exercises, and activity modification. PROBLEM LIST (Impacting functional limitations):  1. Decreased Strength  2. Decreased ADL/Functional Activities  3. Decreased Transfer Abilities  4.  Decreased Ambulation Ability/Technique  5. Decreased Balance  6. Increased Pain  7. Decreased Activity Tolerance  8. Increased Fatigue  9. Increased Shortness of Breath  10. Decreased Flexibility/Joint Mobility  11. Decreased Randall with Home Exercise Program INTERVENTIONS PLANNED:  1. Balance Exercise  2. Bed Mobility  3. Cold  4. Cryotherapy  5. Electrical Stimulation  6. Family Education  7. Gait Training  8. Heat  9. Home Exercise Program (HEP)  10. Manual Therapy  11. Neuromuscular Re-education/Strengthening  12. Range of Motion (ROM)  13. Therapeutic Activites  14. Therapeutic Exercise/Strengthening  15. Transfer Training  16. Lumbar Traction     GOALS: (Goals have been discussed and agreed upon with patient.)     Short-Term Functional Goals: Time Frame: 5/11/2020 to 6/11/2020 (30 days)  Goal Met   1. Anu Hutton will be independent with HEP 1.  [] Date:   2. Anu Hutton will participate in LE stretching program to increase flexibility    2. [] Date:   3. Anu Hutton will participate in core stabilization exercises to help with stabilization during ADLs 3. [] Date:   4. Anu Hutton will participate in LE strengthening program with weights/resistance as appropriate to help with gait and elevations 4. [] Date:   5. Anu Hutton will be educated in and demonstrate proper squat lift technique in order to reduce stress on spine and pelvis, improve safety, and reduce risk of injury. 5.  [] Date:   6. Anu Hutton will tolerate manual therapy/joint mobilizations/soft tissue to increase ROM and decrease pain  6. [] Date:              Discharge Goals: Time Frame: 5/11/2020 to 7/11/2020 (60 days) Goal Met   1. Anu Hutton will improve pain to 1-2/10 for improved tolerance to sleeping greater than 6 hours 1. [] Date:   2. Anu Hutton will demonstrate 4+ to5/5 LE strength on manual muscle testing for ability to perform yard work required around the home 2.   [] Date:   Parish Mccormick Sean Castellanoparul will improve lumbar flexion ROM to 70 degrees for increased ability to perform bending, lifting and squating 3. [] Date:   4. Aditi Zepeda will improve Modified Oswestry Low Back Pain Questionnaire to 10/50 to demonstrate increased tolerance to activity. 4.  [] Date:          Outcome Measure: Tool Used: Modified Oswestry Low Back Pain Questionnaire  Score:  Initial: 27/50  Most Recent: X/50 (Date: -- )   Interpretation of Score: Each section is scored on a 0-5 scale, 5 representing the greatest disability. The scores of each section are added together for a total score of 50. Medical Necessity:   ·    Skilled intervention continues to be required due to above deficits affecting participation in basic ADLs and overall functional tolerance. · Patient demonstrates capacity to improve ROM and strength which will increase independence, increase safety, and allow for return to previous activity level. · Patient demonstrates excellent rehab potential due to higher previous functional level. Reason for Services/Other Comments:  · Patient requires skilled intervention due to modification of therapeutic interventions to increase complexity of exercises . Total Evaluation Duration: 20 minutes     Rehabilitation Potential For Stated Goals: Excellent  Regarding [address] [place] [address], I certify that the treatment plan above will be carried out by a therapist or under their direction. Thank you for this referral,  Maryam Merino PT MSR  Referring Physician Signature: Nicole Eng MD              Date                    HISTORY:   History of Present Injury/Illness (Reason for Referral):     Mr. Denys Zaragoza is a 49 y.o. male presenting to physical therapy with complaints of low back and bilateral hip pain right greater than left.  Patient is returning to physical therapy after having to be discharged secondary to COVID-19 mandates, he states that he was doing better until limited HEP secondary to work and changes in activity.   -Present symptoms/complaints (on day of evaluation)  Pain Scale:  · Current: 8/10  · Best: 4/10  · Worst: 9/10      · Aggravating factors: Standing, Walking, sitting, bending, Lifting and Sleeping   · Relieving factors: Rest and Meds  · Irritability: Medium (Onset of pain is equal to alleviation)      Past Medical History/Comorbidities:   Mr. Denys Zaragoza  has a past medical history of Arthritis, Cancer ([facility] [address] and Unspecified sleep apnea. Mr. Denys Zaragoza  has a past surgical history that includes hx orthopaedic (2002). 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 Patient denies any saddle paresthesia or bowel/bladder deficits. Current Medications:    Current Outpatient Medications:     HYDROcodone-acetaminophen (LORTAB) 7.5-500 mg per tablet, Take 1 Tab by mouth every four (4) hours as needed for Pain., Disp: 25 Tab, Rfl: 0      Ambulatory/Rehab Services H2 Model Falls Risk Assessment    Risk Factors:       (1)  Gender [Male] Ability to Rise from Chair:       (1)  Pushes up, successful in one attempt    Falls Prevention Plan:       No modifications necessary   Total: (5 or greater = High Risk): 2    ©2010 [employer]. All Rights Reserved. [facility] Patent #[id number].  Federal Law prohibits the replication, distribution or use without written permission from [facility] of [address]       Date Last Reviewed:  5/11/2020   Number of Personal Factors/Comorbidities that affect the Plan of Care: 0: LOW COMPLEXITY   EXAMINATION:   Observation/Orthostatic Postural Assessment:           Decreased Lumbar Lordosis and Right Lateral Shift  Palpation:          Increased Tenderness with mild palpation of  Spinous process L2-5 and anterior right hip     ROM:            AROM/PROM         Joint: Eval Date: 5/11/2020  Re-Assess Date:  Re-Assess Date:    Active ROM RIGHT LEFT RIGHT LEFT RIGHT LEFT   Knee Extension WNL WNL       Knee Flexion WNL WNL       Hip Flexion 93 105       Hip Abduction 25 34       Hip External rotation 35 55       Hip Internal rotation 5 18       Lumbar Flexion 50        Lumbar Extension 13          Repeated Motion:  Direction    Frequency Symptoms Prior Symptons Post   Flexion Repeated 10 times  Reproduced Symptoms   Extension Repeated 10 times  Reproduced Symptoms         Strength:     Eval Date: 5/11/2020  Re-Assess Date:  Re-Assess Date:      RIGHT LEFT RIGHT LEFT RIGHT LEFT   Knee Flexion (L5-S2)   4+/5  5/5           Knee Extension (L3, L4)  5/5  5/5           Hip Flexion (L1, L2)  4/5  4+/5           Hip Extension  4/5  4+/5           Hip Abduction (L5, S1)  4/5  4+/5           Ankle Dorsiflexion (L4)  5/5  5/5           Great Toe Extension (L5)  5/5  5/5           Ankle Plantar Flexion (S1-S2)  5/5  5/5               Special Tests:  SLR: Negative  SLUMP: Negative  Scouring:Positive for impingement pain  Fabers:Positive        Manual:   Eval Date: 5/11/2020   Reasess Date:      Joint/Area Directon Grade Treatment Effect Joint Direction Grade Treatment Effect   Lumbar 2-5 PA III and IV Improved Symptoms post treatment           Right hip Distraction III Improved Symptoms post treatment                           Neurological Screen:    RADIATING SYMPTOMS?: No Reflex Testing:      Upper motor Neuron screen         Clonus: Negative         Babinski: Negative    Functional Mobility:  Affecting participation in basic ADLs and functional tasks. Balance:     Single Leg Stance: R= <15 seconds L= <30 seconds     Body Structures Involved:  1. Bones  2. Joints  3. Muscles  4. Ligaments Body Functions Affected:  1. Sensory/Pain  2. Neuromusculoskeletal  3. Movement Related Activities and Participation Affected:  1. Mobility  2.  Self Care   Number of elements that affect the Plan of Care: 1-2: LOW COMPLEXITY   CLINICAL PRESENTATION:   Presentation: Stable and uncomplicated: LOW COMPLEXITY   CLINICAL DECISION MAKING:      Use of outcome tool(s) and clinical judgement create a POC that gives a: Clear prediction of patient's progress: LOW COMPLEXITY     See associated treatment note for treatment provided today      Blaine George, PT

## 2020-05-14 ENCOUNTER — HOSPITAL ENCOUNTER (OUTPATIENT)
Dept: PHYSICAL THERAPY | Age: 55
Discharge: HOME | End: 2020-05-14
Payer: OTHER GOVERNMENT

## 2020-05-14 PROCEDURE — 97110 THERAPEUTIC EXERCISES: CPT

## 2020-05-14 PROCEDURE — 97140 MANUAL THERAPY 1/> REGIONS: CPT

## 2020-05-14 PROCEDURE — 97014 ELECTRIC STIMULATION THERAPY: CPT

## 2020-05-14 NOTE — PROGRESS NOTES
Ja Miller  : 1965  Primary: [facility]  Secondary:  Therapy Center at [address], [place], [address] [place] [address]  [phone]   [phone]      OUTPATIENT PHYSICAL THERAPY: Daily Treatment Note 2020  ICD-10: Treatment Diagnosis:   Lumbago with Sciatica Right side (M54.41)  Pain in left hip (M25.552)  Pain in right hip (M25.551)      Precautions: Congenital dyplasia bilateral hips  Allergies: Patient has no known allergies. TREATMENT PLAN:  Effective Dates: 2020 TO 2020 (60 days). 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.89]  Pain in right hip [M25.551]  Pain in left hip [M25.552]   DATE OF ONSET: Progressive symptoms for 20 years, worst the last year  REFERRING PHYSICIAN: Candace Burnette MD MD Orders: Evaluate and Treat   Return MD Appointment: Not scheduled       Pre-treatment Symptoms/Complaints:  Pt. reported increased pain when he first woke up 8/10. Pt, performed stretches and took pain meds which decreased his pain symptoms. Pain: Initial: Pain Intensity 1: 6  Pain Location 1: Back, Hip  Pain Orientation 1: Lower, Left, Right  Pain Intervention(s) 1: Cold pack, Exercise  Post Session:  4 /10 Pt. Reported feeling much better after session. Medications Last Reviewed:  2020  Updated Objective Findings: Pt. demonstrated moderate antalgic gait intially. TREATMENT:   THERAPEUTIC EXERCISE: (25 minutes):  Exercises per grid below to improve mobility, strength, balance, and dynamic movement of back - generalized to improve functional bending, lifting, carrying, reaching, and overhead activites. Required moderate visual, verbal, and tactile cues to promote proper body alignment, promote proper body posture, promote proper body mechanics, and promote proper body breathing techniques. Progressed resistance, range, repetitions, and complexity of movement as indicated. Date:  5/14/20 Date:  3/17/20  Date:  5/11/20   Activity/Exercise Parameters Parameters Parameters   Nustep X 5 mins level 5 X 10 mins level 5 ---   Calf stretch wedge 4x30 sec hold 4x30 sec hold  [id number] hold    Lumbar rotation X 10 reps x 10 sec hold X 10 reps x 10 sec hold  X 10 reps x 10 sec hold    Hamstring stretch 4x30 sec hold strap Strap 4x30 sec hold  Strap 3x30 sec hold BLE's    Piriformis stretch 4x30 sec hold  4x30 sec hold  3x30 sec hold    IT Band stretch [id number] strap Strap 4x30 sec  Strap 3x30 sec hold   Hip ER stretch [id number]  3x30 sec hold    Pelvic tilts X 10 reps x 5 sec hold      TA activation X 15 reps 5 sec hold [id number] 15x5 sec hold    SKTC 4x30 sec hold   3x30 sec hold BLE's    Sit to Stand  ------- With pad 3x10    3 way hip swing right left ------ 2x10 each    Single leg balance right left ----- [id number] each    Hooklying knee fall out 3x10 blue   3x10 Blue   Bridging with abduction 3x10 green 3x10 blue 3x10 Blue   Hooklying Adduction [id number]  [id number] 20x5 sec   Clam  Right left ---- 2x10 blue    Sidelying Abduction  Right left ----- 2x10 blue    Prone extension  Right left -------- 2x10       Time spent with patient on correct form and muscle recruitment. MANUAL THERAPY: (15 minutes): Joint mobilization, Soft tissue mobilization was utilized and necessary because of the patient's restricted joint motion and restricted motion of soft tissue mobility. Pt. Received soft tissue mobilization to decrease pain and tightness in bilateral lumbosacral paraspinals in prone.         Date  5/14/2020    Technique Used Grade  Level # Time(s) Effect while being performed   Central posterior anterior with prone III IV L2-5 3min Mild tenderness   Rotation   Lumbar 3min    Muscle energy technique  For anterior rotation right innominate   4min Improved gait and decreased pain       MODALITIES: ( 15 minutes):      Pt. received IFC estim and moist hot pack to bilateral lumbosacral parapsinals in prone to decrease pain and tightness. HEP: As above; handouts given to patient for all exercises. Treatment/Session Summary:    · Response to Treatment:  Pt. was compliant with all exercises and reported less pain and tightness  · Communication/Consultation:  None Today  · Equipment provided today:  None today  · Recommendations/Intent for next treatment session: Next visit will focus on Manual Therapy Core Stability Quad strengthening Hip strengthening soft tissue mobilization.     Total Treatment Billable Duration: 55 minutes   PT Patient Time In/Time Out  Time In: 0800  Time Out: 0900  Gu[place]aco Corona, GEORGE

## 2020-05-18 ENCOUNTER — HOSPITAL ENCOUNTER (OUTPATIENT)
Dept: PHYSICAL THERAPY | Age: 55
Discharge: HOME | End: 2020-05-18
Payer: OTHER GOVERNMENT

## 2020-05-18 PROCEDURE — 97110 THERAPEUTIC EXERCISES: CPT

## 2020-05-18 PROCEDURE — 97140 MANUAL THERAPY 1/> REGIONS: CPT

## 2020-05-18 NOTE — PROGRESS NOTES
Bailey Comment  : 1965  Primary: [facility]  Secondary:  Therapy Center at [address], [address] [place] [address] Street  [phone]   [phone]      OUTPATIENT PHYSICAL THERAPY: Daily Treatment Note 2020  ICD-10: Treatment Diagnosis:   Lumbago with Sciatica Right side (M54.41)  Pain in left hip (M25.552)  Pain in right hip (M25.551)      Precautions: Congenital dyplasia bilateral hips  Allergies: Patient has no known allergies. TREATMENT PLAN:  Effective Dates: 2020 TO 2020 (60 days). 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.89]  Pain in right hip [M25.551]  Pain in left hip [M25.552]   DATE OF ONSET: Progressive symptoms for 20 years, worst the last year  REFERRING PHYSICIAN: Vianca Samayoa MD MD Orders: Evaluate and Treat   Return MD Appointment: Not scheduled       Pre-treatment Symptoms/Complaints:  Patient reports injuring left knee yesterday, increased back and hip pain this AM    Pain: Initial: Pain Intensity 1: 8  Pain Location 1: Back, Hip  Pain Orientation 1: Lower, Left, Right  Pain Intervention(s) 1: Exercise  Post Session:  4 /10    Medications Last Reviewed:  2020  Updated Objective Findings: Anterior rotation right innominate    TREATMENT:   THERAPEUTIC EXERCISE: (44 minutes):  Exercises per grid below to improve mobility, strength, balance, and dynamic movement of back - generalized to improve functional bending, lifting, carrying, reaching, and overhead activites. Required moderate visual, verbal, and tactile cues to promote proper body alignment, promote proper body posture, promote proper body mechanics, and promote proper body breathing techniques. Progressed resistance, range, repetitions, and complexity of movement as indicated.      Date:  20 Date:  20 Date:  5/11/20   Activity/Exercise Parameters Parameters Parameters   Nustep X 5 mins level 5 X 10 mins level 5 ---   Calf stretch wedge 4x30 sec hold 4x30 sec hold  [id number] hold    Lumbar rotation X 10 reps x 10 sec hold X 10 reps x 10 sec hold  X 10 reps x 10 sec hold    Hamstring stretch 4x30 sec hold strap Strap 4x30 sec hold  Strap 3x30 sec hold BLE's    Piriformis stretch 4x30 sec hold  4x30 sec hold  3x30 sec hold    IT Band stretch [id number] strap Strap 4x30 sec  Strap 3x30 sec hold   Hip ER stretch [id number] 3x30 sec hold    Pelvic tilts X 10 reps x 5 sec hold      TA activation X 15 reps 5 sec hold [id number] 15x5 sec hold    SKTC 4x30 sec hold   3x30 sec hold BLE's    Sit to Stand  ------- With pad 3x10    3 way hip swing right left ------ 2x10 each    Single leg balance right left ----- ---    Hooklying knee fall out 3x10 blue   3x10 Blue   Bridging with abduction 3x10 green 3x10 blue 3x10 Blue   Hooklying Adduction [id number]  [id number] 20x5 sec   Clam  Right left ---- 3x10 blue    Sidelying Abduction  Right left ----- ---    Prone extension  Right left -------- ---    SB Hamstring curl  3x10    SB Lower extremity extension  3x10      Time spent with patient on correct form and muscle recruitment. MANUAL THERAPY: (10 minutes): Joint mobilization, Soft tissue mobilization was utilized and necessary because of the patient's restricted joint motion and restricted motion of soft tissue mobility. Pt. Received soft tissue mobilization to decrease pain and tightness in bilateral lumbosacral paraspinals in prone.         Date  5/18/2020    Technique Used Grade  Level # Time(s) Effect while being performed   Central posterior anterior with prone III IV L2-5 3min Mild tenderness   Rotation   Lumbar 3min    Muscle energy technique  For anterior rotation right innominate   4min Improved gait and decreased pain       MODALITIES: ( 0 minutes):      Not today     HEP: As above; handouts given to patient for all exercises. Treatment/Session Summary:    · Response to Treatment:  Progressed core and hip strengthening, equal pelvic landmarks post manual therapy with reported decreased pain. · Communication/Consultation:  None Today  · Equipment provided today:  None today  · Recommendations/Intent for next treatment session: Next visit will focus on Manual Therapy Core Stability Quad strengthening Hip strengthening soft tissue mobilization.     Total Treatment Billable Duration: 54 minutes   PT Patient Time In/Time Out  Time In: 0658  Time Out: 0800  Brittany Wagner PT

## 2020-05-21 ENCOUNTER — HOSPITAL ENCOUNTER (OUTPATIENT)
Dept: PHYSICAL THERAPY | Age: 55
Discharge: HOME | End: 2020-05-21
Payer: OTHER GOVERNMENT

## 2020-05-21 PROCEDURE — 97140 MANUAL THERAPY 1/> REGIONS: CPT

## 2020-05-21 PROCEDURE — 97014 ELECTRIC STIMULATION THERAPY: CPT

## 2020-05-21 PROCEDURE — 97110 THERAPEUTIC EXERCISES: CPT

## 2020-05-21 NOTE — PROGRESS NOTES
Shane Aguirre  : 1965  Primary: [facility]  Secondary:  Therapy Center at [address], [place], [address] [place] [address]  [phone]   [phone]      OUTPATIENT PHYSICAL THERAPY: Daily Treatment Note 2020  ICD-10: Treatment Diagnosis:   Lumbago with Sciatica Right side (M54.41)  Pain in left hip (M25.552)  Pain in right hip (M25.551)      Precautions: Congenital dyplasia bilateral hips  Allergies: Patient has no known allergies. TREATMENT PLAN:  Effective Dates: 2020 TO 2020 (60 days). 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.89]  Pain in right hip [M25.551]  Pain in left hip [M25.552]   DATE OF ONSET: Progressive symptoms for 20 years, worst the last year  REFERRING PHYSICIAN: General Adarsh MD MD Orders: Evaluate and Treat   Return MD Appointment: Not scheduled       Pre-treatment Symptoms/Complaints:  Pt. continues to report pain in bilateral low back but stated after exercises he usually feels much better    Pain: Initial: Pain Intensity 1: 6  Pain Location 1: Back, Hip  Pain Orientation 1: Lower, Left, Right  Pain Intervention(s) 1: Exercise, Cold pack  Post Session:  4 /10  Less pain and increased flexibility    Medications Last Reviewed:  2020  Updated Objective Findings: Pt. ambulated with no antalgic gait and better posture today    TREATMENT:   THERAPEUTIC EXERCISE: (30 minutes):  Exercises per grid below to improve mobility, strength, balance, and dynamic movement of back - generalized to improve functional bending, lifting, carrying, reaching, and overhead activites. Required moderate visual, verbal, and tactile cues to promote proper body alignment, promote proper body posture, promote proper body mechanics, and promote proper body breathing techniques.   Progressed resistance, range, repetitions, and complexity of movement as indicated. Date:  5/14/20 Date:  5/18/20  Date:  5/21/20   Activity/Exercise Parameters Parameters Parameters   Nustep X 5 mins level 5 X 10 mins level 5 x 10 mins level 5   Calf stretch wedge 4x30 sec hold 4x30 sec hold  [id number] hold    Lumbar rotation X 10 reps x 10 sec hold X 10 reps x 10 sec hold  X 10 reps x 10 sec hold    Hamstring stretch 4x30 sec hold strap Strap 4x30 sec hold  Strap 4x30 sec hold BLE's    Piriformis stretch 4x30 sec hold  4x30 sec hold  4x30 sec hold    IT Band stretch [id number] strap Strap 4x30 sec  Strap 4x30 sec hold   Hip ER stretch [id number] [id number] 4x30 sec hold    Pelvic tilts X 10 reps x 5 sec hold   X 20 reps 5 sec hold    TA activation X 15 reps 5 sec hold 16g4axy 15x5 sec hold    SKTC 4x30 sec hold   3x30 sec hold BLE's    3 way hip swing right left ------ 2x10 each    Hooklying knee fall out 3x10 blue   3x10 Blue   Bridging with abduction 3x10 green 3x10 blue 3x10 Blue   Hooklying Adduction 06n7dpl  80o0zpe 20x5 sec   Clam  Right left ---- 3x10 blue 3x10 reps blue band    LE knee flexion with swiss ball in supine  3x10 3x10 reps blue band    LE knee extension with swiss ball in supine   3x10 3x10 reps      Time spent with patient on correct form and muscle recruitment. MANUAL THERAPY: (10 minutes): Joint mobilization, Soft tissue mobilization was utilized and necessary because of the patient's restricted joint motion and restricted motion of soft tissue mobility. Pt. Received soft tissue mobilization to decrease pain and tightness in bilateral lumbosacral paraspinals in prone.         Date  5/21/2020    Technique Used Grade  Level # Time(s) Effect while being performed   Central posterior anterior with prone III IV L2-5 3min Mild tenderness   Rotation   Lumbar 3min    Muscle energy technique  For anterior rotation right innominate   4min Improved gait and decreased pain       MODALITIES: ( 15 minutes):      Pt. received IFC estim and moist hot pack to bilateral lumbosacral paraspinals to decrease pain and muscular tightness     HEP: As above; handouts given to patient for all exercises. Treatment/Session Summary:    · Response to Treatment:  Pt. was compliant with all exercises and reported less pain after session  · Communication/Consultation:  None Today  · Equipment provided today:  None today  · Recommendations/Intent for next treatment session: Next visit will focus on Manual Therapy Core Stability Quad strengthening Hip strengthening soft tissue mobilization.     Total Treatment Billable Duration: 55 minutes   PT Patient Time In/Time Out  Time In: 0900  Time Out: [place], PTA

## 2020-05-26 ENCOUNTER — HOSPITAL ENCOUNTER (OUTPATIENT)
Dept: PHYSICAL THERAPY | Age: 55
Discharge: HOME | End: 2020-05-26
Payer: OTHER GOVERNMENT

## 2020-05-26 PROCEDURE — 97110 THERAPEUTIC EXERCISES: CPT

## 2020-05-26 PROCEDURE — 97140 MANUAL THERAPY 1/> REGIONS: CPT

## 2020-05-26 NOTE — PROGRESS NOTES
Maikel Narvaez  : 1965  Primary: [facility]  Secondary:  Therapy Center at [address], [address] [place] [address] Street  [phone]   [phone]      OUTPATIENT PHYSICAL THERAPY: Daily Treatment Note 2020  ICD-10: Treatment Diagnosis:   Lumbago with Sciatica Right side (M54.41)  Pain in left hip (M25.552)  Pain in right hip (M25.551)      Precautions: Congenital dyplasia bilateral hips  Allergies: Patient has no known allergies. TREATMENT PLAN:  Effective Dates: 2020 TO 2020 (60 days). 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.89]  Pain in right hip [M25.551]  Pain in left hip [M25.552]   DATE OF ONSET: Progressive symptoms for 20 years, worst the last year  REFERRING PHYSICIAN: Cassandra Street MD MD Orders: Evaluate and Treat   Return MD Appointment: Not scheduled       Pre-treatment Symptoms/Complaints:  Patient reports improved mobility and pain    Pain: Initial: Pain Intensity 1: 5  Pain Location 1: Back, Hip  Pain Orientation 1: Lower, Right, Left  Pain Intervention(s) 1: Exercise  Post Session:  3 /10    Medications Last Reviewed:  2020  Updated Objective Findings: Anterior rotation right innominate    TREATMENT:   THERAPEUTIC EXERCISE: (45 minutes):  Exercises per grid below to improve mobility, strength, balance, and dynamic movement of back - generalized to improve functional bending, lifting, carrying, reaching, and overhead activites. Required moderate visual, verbal, and tactile cues to promote proper body alignment, promote proper body posture, promote proper body mechanics, and promote proper body breathing techniques. Progressed resistance, range, repetitions, and complexity of movement as indicated.      Date:  20 Date:  20  Date:  20   Activity/Exercise Parameters Parameters Parameters   Nustep X 10 mins level 5 X 10 mins level 5 x 10 mins level 5   Calf stretch wedge 4x30 sec hold 4x30 sec hold  [id number] hold    Lumbar rotation X 10 reps x 10 sec hold X 10 reps x 10 sec hold  X 10 reps x 10 sec hold    Hamstring stretch 4x30 sec hold strap Strap 4x30 sec hold  Strap 4x30 sec hold BLE's    Piriformis stretch 4x30 sec hold  4x30 sec hold  4x30 sec hold    IT Band stretch 4t15hse strap Strap 4x30 sec  Strap 4x30 sec hold   Hip ER stretch 7d04zmg 9j79dld 4x30 sec hold    Pelvic tilts X 10 reps x 5 sec hold   X 20 reps 5 sec hold    TA activation X 15 reps 5 sec hold 98o9oya 15x5 sec hold    SKTC 4x30 sec hold   3x30 sec hold BLE's    Sit to stand 3x10     3 way hip swing right left 2x10 Airex 2x10 each    Mini Squat Airex 3x10     Crunch 2x10     Hooklying knee fall out   3x10 Blue   Bridging with abduction 3x10 blue 3x10 blue 3x10 Blue   Hooklying Adduction 13b2qnu  69g9dgi 20x5 sec   Clam  Right left 3x10 blue 3x10 blue 3x10 reps blue band    Sidelying Hip abduction 2x10 blue     Isometric hip flexion  23o9nkc each     LE knee flexion with swiss ball in supine  3x10 3x10 reps blue band    LE knee extension with swiss ball in supine   3x10 3x10 reps      Time spent with patient on correct form and muscle recruitment. MANUAL THERAPY: (8 minutes): Joint mobilization, Soft tissue mobilization was utilized and necessary because of the patient's restricted joint motion and restricted motion of soft tissue mobility. Pt. Received soft tissue mobilization to decrease pain and tightness in bilateral lumbosacral paraspinals in prone.         Date  5/26/2020    Technique Used Grade  Level # Time(s) Effect while being performed   Central posterior anterior with prone III IV L2-5 2min Mild tenderness   Rotation   Lumbar 4min    Muscle energy technique  For anterior rotation right innominate   2min Improved gait and decreased pain       MODALITIES: ( 0 minutes):      None today     HEP: As above; handouts given to patient for all exercises. Treatment/Session Summary:    · Response to Treatment:  Equal pelvic land marks post manual therapy with reported less pain. · Communication/Consultation:  None Today  · Equipment provided today:  None today  · Recommendations/Intent for next treatment session: Next visit will focus on Manual Therapy Core Stability Quad strengthening Hip strengthening soft tissue mobilization.     Total Treatment Billable Duration: 55 minutes   PT Patient Time In/Time Out  Time In: 0700  Time Out: 8841 Laura Gonzáles PT

## 2020-05-28 ENCOUNTER — HOSPITAL ENCOUNTER (OUTPATIENT)
Dept: PHYSICAL THERAPY | Age: 55
Discharge: HOME | End: 2020-05-28
Payer: OTHER GOVERNMENT

## 2020-05-28 PROCEDURE — 97110 THERAPEUTIC EXERCISES: CPT

## 2020-05-28 PROCEDURE — 97014 ELECTRIC STIMULATION THERAPY: CPT

## 2020-05-28 NOTE — PROGRESS NOTES
Tasha Ferguson  : 1965  Primary: [facility]  Secondary:  Therapy Center at [address], [place], [address] [place] [address]  [phone]   [phone]      OUTPATIENT PHYSICAL THERAPY: Daily Treatment Note 2020  ICD-10: Treatment Diagnosis:   Lumbago with Sciatica Right side (M54.41)  Pain in left hip (M25.552)  Pain in right hip (M25.551)      Precautions: Congenital dyplasia bilateral hips  Allergies: Patient has no known allergies. TREATMENT PLAN:  Effective Dates: 2020 TO 2020 (60 days). 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.89]  Pain in right hip [M25.551]  Pain in left hip [M25.552]   DATE OF ONSET: Progressive symptoms for 20 years, worst the last year  REFERRING PHYSICIAN: Stephanie Tipton MD MD Orders: Evaluate and Treat   Return MD Appointment: Not scheduled       Pre-treatment Symptoms/Complaints:  Pt. continues to report pain and mishaps with performing tasks that cause increased pain    Pain: Initial: Pain Intensity 1: 7  Pain Location 1: Back, Hip  Pain Orientation 1: Lower, Left, Right  Pain Intervention(s) 1: Exercise  Post Session:  3 /10    Medications Last Reviewed:  2020  Updated Objective Findings: Pt. entered clinic with antalgic gait and circumducting right hip    TREATMENT:   THERAPEUTIC EXERCISE: (45 minutes):  Exercises per grid below to improve mobility, strength, balance, and dynamic movement of back - generalized to improve functional bending, lifting, carrying, reaching, and overhead activites. Required moderate visual, verbal, and tactile cues to promote proper body alignment, promote proper body posture, promote proper body mechanics, and promote proper body breathing techniques. Progressed resistance, range, repetitions, and complexity of movement as indicated. Date:  5/26/20 Date:  5/28/20  Date:  5/21/20   Activity/Exercise Parameters Parameters Parameters   Nustep X 10 mins level 5 X 10 mins level 5 x 10 mins level 5   Calf stretch wedge 4x30 sec hold 4x30 sec hold  [id number] hold    Lumbar rotation X 10 reps x 10 sec hold X 10 reps x 10 sec hold  X 10 reps x 10 sec hold    Hamstring stretch 4x30 sec hold strap Strap 4x30 sec hold  Strap 4x30 sec hold BLE's    Piriformis stretch 4x30 sec hold  4x30 sec hold  4x30 sec hold    IT Band stretch 1g60iim strap Strap 4x30 sec  Strap 4x30 sec hold   Hip ER stretch 4d48xmy 4x30 sec hold 4x30 sec hold    Pelvic tilts X 10 reps x 5 sec hold  X 20 reps 5 sec hold  X 20 reps 5 sec hold    TA activation X 15 reps 5 sec hold X 15 reps 5 sec hold  15x5 sec hold    SKTC 4x30 sec hold  4x30 sec hold  3x30 sec hold BLE's    3 way hip swing right left 2x10 Airex 2x10 each    Mini Squat Airex 3x10 airex 3x10 reps    Crunch 2x10 X 20 reps     Hooklying knee fall out  X 30 reps blue band 3x10 Blue   Bridging with abduction 3x10 blue 3x10 blue 3x10 Blue   Hooklying Adduction [id number]  88y3aty 20x5 sec   Clam  Right left 3x10 blue 3x10 blue 3x10 reps blue band    Sidelying Hip abduction 2x10 blue 3x10 blue     Isometric hip flexion  46v8azy each X 20 reps 5 sec hold     LE knee flexion with swiss ball in supine  3x10 3x10 reps blue ball   LE knee extension with swiss ball in supine   3x10 3x10 reps      Time spent with patient on correct form and muscle recruitment. MANUAL THERAPY: (0 minutes): Joint mobilization, Soft tissue mobilization was utilized and necessary because of the patient's restricted joint motion and restricted motion of soft tissue mobility. Pt. Received soft tissue mobilization to decrease pain and tightness in bilateral lumbosacral paraspinals in prone. No manual performed today.         Date  5/28/2020    Technique Used Grade  Level # Time(s) Effect while being performed   Central posterior anterior with prone III IV L2-5 2min Mild tenderness   Rotation   Lumbar 4min    Muscle energy technique  For anterior rotation right innominate   2min Improved gait and decreased pain       MODALITIES: ( 15 minutes):      Pt. received IFC estim and moist hot pack to bilateral lumbosacral paraspinals in prone to decrease pain and tightness in bilateral lumbar musculature     HEP: As above; handouts given to patient for all exercises. Treatment/Session Summary:    · Response to Treatment:  Pt. compliant with all exercises and reported less pain and tightness. · Communication/Consultation:  None Today  · Equipment provided today:  None today  · Recommendations/Intent for next treatment session: Next visit will focus on Manual Therapy Core Stability Quad strengthening Hip strengthening soft tissue mobilization.     Total Treatment Billable Duration: 60 minutes   PT Patient Time In/Time Out  Time In: 0800  Time Out: 0905  Florencia Fothergill, PTA

## 2020-06-01 ENCOUNTER — HOSPITAL ENCOUNTER (OUTPATIENT)
Dept: PHYSICAL THERAPY | Age: 55
Discharge: HOME | End: 2020-06-01
Payer: OTHER GOVERNMENT

## 2020-06-01 ENCOUNTER — APPOINTMENT (OUTPATIENT)
Dept: PHYSICAL THERAPY | Age: 55
End: 2020-06-01
Payer: OTHER GOVERNMENT

## 2020-06-01 PROCEDURE — 97140 MANUAL THERAPY 1/> REGIONS: CPT

## 2020-06-01 PROCEDURE — 97110 THERAPEUTIC EXERCISES: CPT

## 2020-06-01 NOTE — PROGRESS NOTES
Pancho Schultz  : 1965  Primary: [facility]  Secondary:  Therapy Center at [address], [place] [facility], [address] [place] [address] Street  [phone]   [phone]          OUTPATIENT PHYSICAL THERAPY:Progress Report 2020      ICD-10: Treatment Diagnosis:   Lumbago with Sciatica Right side (M54.41)  Pain in left hip (M25.552)  Pain in right hip (M25.551)         Precautions: Congenital dyplasia bilateral hips  Allergies: Patient has no known allergies. TREATMENT PLAN:  Effective Dates: 2020 TO 2020 (60 days). 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 Judson Post is a 49 y.o. male presenting to physical therapy with complaints of low back and bilateral hip pain right greater than left. Patient is returning to physical therapy after having to be discharged secondary to COVID-19 mandates, he states that he was doing better until limited HEP secondary to work and changes in activity. Patient is motivated to decrease pain and improve overall mobility. Patient presents with increased pain, decreased strength, decreased ROM, decreased flexibility, decreased activity tolerance, and overall impaired functional mobility. Patient is a good candidate for skilled intervention with services to include manual therapy, modalities as needed, therapeutic exercises, and activity modification. PROGRESS NOTE (2020):  Patient has been seen for 7 sessions of physical therapy from 2020 to 2020.  Patient reports feeling 50% better with with pain and tolerance to activity. Patient will benefit from continued skilled physical therapy to address remaining goals and deficits. PROBLEM LIST (Impacting functional limitations):  1. Decreased Strength  2. Decreased ADL/Functional Activities  3. Decreased Transfer Abilities  4. Decreased Ambulation Ability/Technique  5. Decreased Balance  6. Increased Pain  7. Decreased Activity Tolerance  8. Increased Fatigue  9. Increased Shortness of Breath  10. Decreased Flexibility/Joint Mobility  11. Decreased [place] with Home Exercise Program INTERVENTIONS PLANNED:  1. Balance Exercise  2. Bed Mobility  3. Cold  4. Cryotherapy  5. Electrical Stimulation  6. Family Education  7. Gait Training  8. Heat  9. Home Exercise Program (HEP)  10. Manual Therapy  11. Neuromuscular Re-education/Strengthening  12. Range of Motion (ROM)  13. Therapeutic Activites  14. Therapeutic Exercise/Strengthening  15. Transfer Training  16. Lumbar Traction     GOALS: (Goals have been discussed and agreed upon with patient.)     Short-Term Functional Goals: Time Frame: 5/11/2020 to 6/11/2020 (30 days)  Goal Met   1. Ossie Baumgarten will be independent with HEP 1. [x] Date: 6/1/2020   2. Ossie Baumgarten will participate in LE stretching program to increase flexibility    2. [x] Date:  6/1/2020   3. Ossie Baumgarten will participate in core stabilization exercises to help with stabilization during ADLs 3. [x] Date:  6/1/2020   4. Ossie Baumgarten will participate in LE strengthening program with weights/resistance as appropriate to help with gait and elevations 4. [x] Date:  6/1/2020   5. Ossie Baumgarten will be educated in and demonstrate proper squat lift technique in order to reduce stress on spine and pelvis, improve safety, and reduce risk of injury. 5.  [x] Date:  6/1/2020   6. Ossie Baumgarten will tolerate manual therapy/joint mobilizations/soft tissue to increase ROM and decrease pain  6.   [x] Date:  6/1/2020 Discharge Goals: Time Frame: 5/11/2020 to 7/11/2020 (60 days) Goal Met   1. Dian Monroe will improve pain to 1-2/10 for improved tolerance to sleeping greater than 6 hours 1. [] Date:   2. Dian Monroe will demonstrate 4+ to5/5 LE strength on manual muscle testing for ability to perform yard work required around the home 2. [] Date:   3. Dian Monroe will improve lumbar flexion ROM to 70 degrees for increased ability to perform bending, lifting and squating 3. [] Date:   4. Dian Monroe will improve Modified Oswestry Low Back Pain Questionnaire to 10/50 to demonstrate increased tolerance to activity. 4.  [] Date:          Outcome Measure: Tool Used: Modified Oswestry Low Back Pain Questionnaire  Score:  Initial: 27/50  Most Recent: X/50 (Date: -- )   Interpretation of Score: Each section is scored on a 0-5 scale, 5 representing the greatest disability. The scores of each section are added together for a total score of 50.           Observation/Orthostatic Postural Assessment:           Decreased Lumbar Lordosis   Palpation:          Mild Tenderness with palpation of  Spinous process L2-5 and anterior right hip     ROM:            AROM/PROM         Joint: Eval Date: 6/1/2020  Re-Assess Date: 6/1/2020  Re-Assess Date:    Active ROM RIGHT LEFT RIGHT LEFT RIGHT LEFT   Knee Extension WNL WNL WNL WNL     Knee Flexion WNL WNL WNL WNL     Hip Flexion 93 105 100 105     Hip Abduction 25 34 30 35     Hip External rotation 35 55 42 55     Hip Internal rotation 5 18 10 18     Lumbar Flexion 50  57      Lumbar Extension 13  15        Repeated Motion:  Direction    Frequency Symptoms Prior Symptons Post   Flexion Repeated 10 times  No Symptoms   Extension Repeated 10 times  No Symptoms         Strength:     Eval Date: 6/1/2020  Re-Assess Date: 6/1/2020  Re-Assess Date:      RIGHT LEFT RIGHT LEFT RIGHT LEFT   Knee Flexion (L5-S2)   4+/5  5/5 4+/5 5/5       Knee Extension (L3, L4)  5/5  5/5 5/5 5/5       Hip Flexion (L1, L2)  4/5  4+/5 4+/5 4+/5       Hip Extension  4/5  4+/5 4+/5 4+/5       Hip Abduction (L5, S1)  4/5  4+/5 4/5 4+/5       Ankle Dorsiflexion (L4)  5/5  5/5 5/5 5/5       Great Toe Extension (L5)  5/5  5/5 5/5 5/5       Ankle Plantar Flexion (S1-S2)  5/5  5/5 5/5 5/5           Special Tests:  SLR: Negative  SLUMP: Negative  Scouring:Positive for impingement pain  Fabers:Positive        Manual:   Eval Date: 6/1/2020   Reasess Date: 6/1/2020      Joint/Area Directon Grade Treatment Effect Joint Direction Grade Treatment Effect   Lumbar 2-5 PA III and IV Improved Symptoms post treatment Lumbar 2-5 PA III and IV Improved Symptoms post treatment   Right hip Distraction III Improved Symptoms post treatment Right Hip Distration III and IV Improved Symptoms post treatment               Neurological Screen:    RADIATING SYMPTOMS?: No      Upper motor [place] screen         Clonus: Negative         Babinski: Negative    Functional Mobility:  Affecting participation in basic ADLs and functional tasks. Balance:     Single Leg Stance: R= <30 seconds L= <30 seconds           Medical Necessity:   ·    Skilled intervention continues to be required due to above deficits affecting participation in basic ADLs and overall functional tolerance. · Patient demonstrates capacity to improve ROM and strength which will increase independence, increase safety, and allow for return to previous activity level. · Patient demonstrates excellent rehab potential due to higher previous functional level. Reason for Services/Other Comments:  · Patient requires skilled intervention due to modification of therapeutic interventions to increase complexity of exercises . Rehabilitation Potential For Stated Goals: Excellent  Regarding [address] [place] [address], I certify that the treatment plan above will be carried out by a therapist or under their direction.   Thank you for this referral,  Lisbeth Hoskins, PT MSR

## 2020-06-01 NOTE — PROGRESS NOTES
Rajan Fraction  : 1965  Primary: [facility]  Secondary:  Therapy Center at [address], [place], [address] [place] [address]  [phone]   [phone]      OUTPATIENT PHYSICAL THERAPY: Daily Treatment Note 2020  ICD-10: Treatment Diagnosis:   Lumbago with Sciatica Right side (M54.41)  Pain in left hip (M25.552)  Pain in right hip (M25.551)      Precautions: Congenital dyplasia bilateral hips  Allergies: Patient has no known allergies. TREATMENT PLAN:  Effective Dates: 2020 TO 2020 (60 days). 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.89]  Pain in right hip [M25.551]  Pain in left hip [M25.552]   DATE OF ONSET: Progressive symptoms for 20 years, worst the last year  REFERRING PHYSICIAN: Silviano Haskins MD MD Orders: Evaluate and Treat   Return MD Appointment: Not scheduled       Pre-treatment Symptoms/Complaints:  Please See Progress Report Dated 2020 for more details. Pain: Initial: Pain Intensity 1: 4  Pain Location 1: Back, Hip  Pain Orientation 1: Lower, Left, Right  Pain Intervention(s) 1: Exercise, Declines  Post Session:  3 /10    Medications Last Reviewed:  2020  Updated Objective Findings: Please See Progress Report dated 2020 for more details. TREATMENT:   THERAPEUTIC EXERCISE: (47 minutes):  Exercises per grid below to improve mobility, strength, balance, and dynamic movement of back - generalized to improve functional bending, lifting, carrying, reaching, and overhead activites. Required moderate visual, verbal, and tactile cues to promote proper body alignment, promote proper body posture, promote proper body mechanics, and promote proper body breathing techniques. Progressed resistance, range, repetitions, and complexity of movement as indicated.      Date:  20 Date:  20 Date:  6/1/20   Activity/Exercise Parameters Parameters Parameters   Nustep X 10 mins level 5 X 10 mins level 5 x 10 mins level 5   Calf stretch wedge 4x30 sec hold 4x30 sec hold  [id number] hold    Lumbar rotation X 10 reps x 10 sec hold X 10 reps x 10 sec hold  X 10 reps x 10 sec hold    Hamstring stretch 4x30 sec hold strap Strap 4x30 sec hold  Strap 4x30 sec hold BLE's    Piriformis stretch 4x30 sec hold  4x30 sec hold  4x30 sec hold    IT Band stretch [id number] strap Strap 4x30 sec  Strap 4x30 sec hold   Hip ER stretch 3u70yre 4x30 sec hold 4x30 sec hold    Pelvic tilts X 10 reps x 5 sec hold  X 20 reps 5 sec hold  X 20 reps 5 sec hold    TA activation X 15 reps 5 sec hold X 15 reps 5 sec hold  ---   SKTC 4x30 sec hold  4x30 sec hold     3 way hip swing right left 2x10 Airex 2x10 each 2x10 Airex   Mini Squat Airex 3x10 airex 3x10 reps    Crunch 2x10 X 20 reps  3x10   Hooklying knee fall out  X 30 reps blue band ---   Bridging with abduction 3x10 blue 3x10 blue 3x10 Blue   Hooklying Adduction [id number]  [id number] ---   Clam  Right left 3x10 blue 3x10 blue 3x10 reps blue band    Sidelying Hip abduction 2x10 blue 3x10 blue  3x10 blue   Isometric hip flexion  24l0ely each X 20 reps 5 sec hold  X 20 reps 5 sec hold    Prone opp UE LE extension   2x10   LE knee flexion with swiss ball in supine  3x10 ---   LE knee extension with swiss ball in supine   3x10 ---   Lateral walking   4x15' green band at knees   Statue single leg balance right left   2x10 each   Modified Plank      Side Plank        Time spent with patient on correct form and muscle recruitment. MANUAL THERAPY: (8 minutes): Joint mobilization, Soft tissue mobilization was utilized and necessary because of the patient's restricted joint motion and restricted motion of soft tissue mobility. Pt. Received soft tissue mobilization to decrease pain and tightness in bilateral lumbosacral paraspinals in prone. No manual performed today.         Date  6/1/2020 Technique Used Grade  Level # Time(s) Effect while being performed   Central posterior anterior with prone III IV L2-5 2min Mild tenderness   Rotation   Lumbar 4min    Muscle energy technique  For anterior rotation right innominate   2min Improved gait and decreased pain       MODALITIES: ( 0 minutes):      Pt. received IFC estim and moist hot pack to bilateral lumbosacral paraspinals in prone to decrease pain and tightness in bilateral lumbar musculature not today    HEP: As above; handouts given to patient for all exercises. Treatment/Session Summary:    · Response to Treatment:  Please See Progress Note dated 6/1/2020 for more details. · Communication/Consultation:  None Today  · Equipment provided today:  None today  · Recommendations/Intent for next treatment session: Next visit will focus on Manual Therapy Core Stability Quad strengthening Hip strengthening soft tissue mobilization.     Total Treatment Billable Duration: 55 minutes   PT Patient Time In/Time Out  Time In: 0900  Time Out: 96 Hina Candelario PT

## 2020-06-03 NOTE — PROGRESS NOTES
I am accessing Mr. Linda Tran chart as a part of our department's internal chart auditing process. I certify that Mr. Nemo Thao is, or was, a patient in our department.   Thank you,  Nina Tam, PT  6/3/2020

## 2020-06-04 ENCOUNTER — HOSPITAL ENCOUNTER (OUTPATIENT)
Dept: PHYSICAL THERAPY | Age: 55
Discharge: HOME | End: 2020-06-04
Payer: OTHER GOVERNMENT

## 2020-06-04 PROCEDURE — 97014 ELECTRIC STIMULATION THERAPY: CPT

## 2020-06-04 PROCEDURE — 97110 THERAPEUTIC EXERCISES: CPT

## 2020-06-04 NOTE — PROGRESS NOTES
Jay Jay Meraz  : 1965  Primary: [facility]  Secondary:  Therapy Center at [address], [address]  [phone]   [phone]      OUTPATIENT PHYSICAL THERAPY: Daily Treatment Note 2020  ICD-10: Treatment Diagnosis:   Lumbago with Sciatica Right side (M54.41)  Pain in left hip (M25.552)  Pain in right hip (M25.551)      Precautions: Congenital dyplasia bilateral hips  Allergies: Patient has no known allergies. TREATMENT PLAN:  Effective Dates: 2020 TO 2020 (60 days). 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.89]  Pain in right hip [M25.551]  Pain in left hip [M25.552]   DATE OF ONSET: Progressive symptoms for 20 years, worst the last year  REFERRING PHYSICIAN: Ines Shields MD MD Orders: Evaluate and Treat   Return MD Appointment: Not scheduled       Pre-treatment Symptoms/Complaints:  Pt. adjusted when he takes his pain meds due to going back to work to sleep  better. Pain: Initial: Pain Intensity 1: 3  Pain Location 1: Back, Hip  Pain Orientation 1: Lower, Left, Right  Pain Intervention(s) 1: Exercise, Ice  Post Session: 2/10 less tightness   Medications Last Reviewed:  2020  Updated Objective Findings: minimal antalgic gait pattern    TREATMENT:   THERAPEUTIC EXERCISE: (45 minutes):  Exercises per grid below to improve mobility, strength, balance, and dynamic movement of back - generalized to improve functional bending, lifting, carrying, reaching, and overhead activites. Required moderate visual, verbal, and tactile cues to promote proper body alignment, promote proper body posture, promote proper body mechanics, and promote proper body breathing techniques. Progressed resistance, range, repetitions, and complexity of movement as indicated.      Date:  20 Date:  5/28/20  Date:  6/1/20   Activity/Exercise Parameters Parameters Parameters   Nustep X 10 mins level 5 X 10 mins level 5 x 10 mins level 5   Calf stretch wedge 4x30 sec hold 4x30 sec hold  [id number] hold    Lumbar rotation X 10 reps x 10 sec hold X 10 reps x 10 sec hold  X 10 reps x 10 sec hold    Hamstring stretch 4x30 sec hold strap Strap 4x30 sec hold  Strap 4x30 sec hold BLE's    Piriformis stretch 4x30 sec hold  4x30 sec hold  4x30 sec hold    IT Band stretch 1f03vwe strap Strap 4x30 sec  Strap 4x30 sec hold   Hip ER stretch 7w64mit 4x30 sec hold 4x30 sec hold    Pelvic tilts X 10 reps x 5 sec hold  X 20 reps 5 sec hold  X 20 reps 5 sec hold    TA activation X 15 reps 5 sec hold X 15 reps 5 sec hold  ---   SKTC 4x30 sec hold  4x30 sec hold     3 way hip swing right left 3x10 Airex 2x10 each 2x10 Airex   Mini Squat Airex 3x10 airex 3x10 reps    Crunch 2x10 X 20 reps  3x10   Hooklying knee fall out X 30 reps blue band X 30 reps blue band ---   Bridging with abduction 3x10 blue 3x10 blue 3x10 Blue   Hooklying Adduction [id number]  [id number] ---   Clam  Right left 3x10 blue 3x10 blue 3x10 reps blue band    Sidelying Hip abduction 2x10 blue 3x10 blue  3x10 blue   Isometric hip flexion  23c6wto each X 20 reps 5 sec hold  X 20 reps 5 sec hold    Prone opp UE LE extension 2x10 reps   2x10   LE knee flexion with swiss ball in supine 3x10  3x10 ---   LE knee extension with swiss ball in supine  3x10  3x10 ---   Lateral walking Blue  band 6 x 15 ft  4x15' green band at knees   Statue single leg balance right left 2x10 reps   2x10 each   Modified Plank hep     Side Plank hep       Time spent with patient on correct form and muscle recruitment. MANUAL THERAPY: (0 minutes): Joint mobilization, Soft tissue mobilization was utilized and necessary because of the patient's restricted joint motion and restricted motion of soft tissue mobility.   Pt. Received soft tissue mobilization to decrease pain and tightness in bilateral lumbosacral paraspinals in prone. No manual performed today. Date  6/4/2020    Technique Used Grade  Level # Time(s) Effect while being performed   Central posterior anterior with prone III IV L2-5 2min Mild tenderness   Rotation   Lumbar 4min    Muscle energy technique  For anterior rotation right innominate   2min Improved gait and decreased pain       MODALITIES: ( 15 minutes):      Pt. received IFC estim and moist hot pack to bilateral lumbosacral paraspinals in prone to decrease pain and tightness in bilateral lumbar musculature     HEP: As above; handouts given to patient for all exercises. Treatment/Session Summary:    · Response to Treatment:  Pt. continues to be compliant with all exercises and progressing  with  strengthening and endurance. · Communication/Consultation:  None Today  · Equipment provided today:  None today  · Recommendations/Intent for next treatment session: Next visit will focus on Manual Therapy Core Stability Quad strengthening Hip strengthening soft tissue mobilization.     Total Treatment Billable Duration: 60 minutes   PT Patient Time In/Time Out  Time In: 0800  Time Out: 0905  Tana Durant PTA

## 2020-06-08 ENCOUNTER — APPOINTMENT (OUTPATIENT)
Dept: PHYSICAL THERAPY | Age: 55
End: 2020-06-08
Payer: OTHER GOVERNMENT

## 2020-06-08 ENCOUNTER — HOSPITAL ENCOUNTER (OUTPATIENT)
Dept: PHYSICAL THERAPY | Age: 55
Discharge: HOME | End: 2020-06-08
Payer: OTHER GOVERNMENT

## 2020-06-08 PROCEDURE — 97110 THERAPEUTIC EXERCISES: CPT

## 2020-06-08 PROCEDURE — 97140 MANUAL THERAPY 1/> REGIONS: CPT

## 2020-06-08 NOTE — PROGRESS NOTES
Jay Jay Meraz  : 1965  Primary: [facility]  Secondary:  Therapy Center at [address], [address]  [phone]   [phone]      OUTPATIENT PHYSICAL THERAPY: Daily Treatment Note 2020  ICD-10: Treatment Diagnosis:   Lumbago with Sciatica Right side (M54.41)  Pain in left hip (M25.552)  Pain in right hip (M25.551)      Precautions: Congenital dyplasia bilateral hips  Allergies: Patient has no known allergies. TREATMENT PLAN:  Effective Dates: 2020 TO 2020 (60 days). 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.89]  Pain in right hip [M25.551]  Pain in left hip [M25.552]   DATE OF ONSET: Progressive symptoms for 20 years, worst the last year  REFERRING PHYSICIAN: Ines Shields MD MD Orders: Evaluate and Treat   Return MD Appointment: Not scheduled       Pre-treatment Symptoms/Complaints:  Patient reports increased back and hip pain the last two days with no change in activity     Pain: Initial: Pain Intensity 1: 7  Pain Location 1: Back, Hip  Pain Orientation 1: Lower, Left, Right  Pain Intervention(s) 1: Cold pack, Exercise  Post Session: 3/10    Medications Last Reviewed:  2020  Updated Objective Findings: Anterior rotation right innominate    TREATMENT:   THERAPEUTIC EXERCISE: (43 minutes):  Exercises per grid below to improve mobility, strength, balance, and dynamic movement of back - generalized to improve functional bending, lifting, carrying, reaching, and overhead activites. Required moderate visual, verbal, and tactile cues to promote proper body alignment, promote proper body posture, promote proper body mechanics, and promote proper body breathing techniques. Progressed resistance, range, repetitions, and complexity of movement as indicated.      Date:  20 Date:  6/8/20  Date:  6/1/20   Activity/Exercise Parameters Parameters Parameters   Nustep X 10 mins level 5 X 10 mins level 5 x 10 mins level 5   Calf stretch wedge 4x30 sec hold 4x30 sec hold  [id number] hold    Lumbar rotation X 10 reps x 10 sec hold X 10 reps x 10 sec hold  X 10 reps x 10 sec hold    Hamstring stretch 4x30 sec hold strap Strap 4x30 sec hold  Strap 4x30 sec hold BLE's    Piriformis stretch 4x30 sec hold  4x30 sec hold  4x30 sec hold    IT Band stretch 5c63fud strap Strap 4x30 sec  Strap 4x30 sec hold   Hip ER stretch 2l12apu 4x30 sec hold 4x30 sec hold    Pelvic tilts X 10 reps x 5 sec hold  X 20 reps 5 sec hold  X 20 reps 5 sec hold    TA activation X 15 reps 5 sec hold X 15 reps 5 sec hold  ---   SKTC 4x30 sec hold  4x30 sec hold     3 way hip swing right left 3x10 Airex 2x10 each 2x10 Airex   Mini Squat Airex 3x10 airex 3x10 reps    Crunch 2x10 3x10 reps  3x10   Hooklying knee fall out X 30 reps blue band --- ---   Bridging with abduction 3x10 blue 3x12 blue 3x10 Blue   Hooklying Adduction [id number]  [id number] ---   Clam  Right left 3x10 blue 3x10 blue 3x10 reps blue band    Sidelying Hip abduction 2x10 blue 3x10 blue  3x10 blue   Isometric hip flexion  72e2xyn each X 20 reps 5 sec hold  X 20 reps 5 sec hold    Prone opp UE LE extension 2x10 reps  3x10 2x10   LE knee flexion with swiss ball in supine 3x10   ---   LE knee extension with swiss ball in supine  3x10   ---   Lateral walking Blue  band 6 x 15 ft Blue  band 6 x 15 ft 4x15' green band at knees   Monster walk  Blue  band 6 x 15 ft    Statue single leg balance right left 2x10 reps  2x10 Airex 2x10 each   Modified Plank hep     Side Plank hep       Time spent with patient on correct form and muscle recruitment. MANUAL THERAPY: (10 minutes): Joint mobilization, Soft tissue mobilization was utilized and necessary because of the patient's restricted joint motion and restricted motion of soft tissue mobility.   Pt. Received soft tissue mobilization to decrease pain and tightness in bilateral lumbosacral paraspinals in prone. No manual performed today. Date  6/8/2020    Technique Used Grade  Level # Time(s) Effect while being performed   Central posterior anterior with prone III IV L2-5 2min Mild tenderness   Rotation   Lumbar 4min    Muscle energy technique  For anterior rotation right innominate   4min Improved gait and decreased pain       MODALITIES: ( minutes):      Not today     HEP: As above; handouts given to patient for all exercises. Treatment/Session Summary:    · Response to Treatment:  Patient was instructed in self mobilization technique for reducing anterior rotation of right innominate, decreased pain post treatment. · Communication/Consultation:  None Today  · Equipment provided today:  None today  · Recommendations/Intent for next treatment session: Next visit will focus on Manual Therapy Core Stability Quad strengthening Hip strengthening soft tissue mobilization.     Total Treatment Billable Duration: 53 minutes   PT Patient Time In/Time Out  Time In: 0900  Time Out: 45 [facility], PT

## 2020-06-11 ENCOUNTER — HOSPITAL ENCOUNTER (OUTPATIENT)
Dept: PHYSICAL THERAPY | Age: 55
Discharge: HOME | End: 2020-06-11
Payer: OTHER GOVERNMENT

## 2020-06-11 PROCEDURE — 97110 THERAPEUTIC EXERCISES: CPT

## 2020-06-11 NOTE — PROGRESS NOTES
Pancho Schultz  : 1965  Primary: [facility]  Secondary:  Therapy Center at [facility]  [address] [place] [address], [facility], [address] [place] [address]  [phone]   [phone]      OUTPATIENT PHYSICAL THERAPY: Daily Treatment Note 2020  ICD-10: Treatment Diagnosis:   Lumbago with Sciatica Right side (M54.41)  Pain in left hip (M25.552)  Pain in right hip (M25.551)      Precautions: Congenital dyplasia bilateral hips  Allergies: Patient has no known allergies. TREATMENT PLAN:  Effective Dates: 2020 TO 2020 (60 days). 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.89]  Pain in right hip [M25.551]  Pain in left hip [M25.552]   DATE OF ONSET: Progressive symptoms for 20 years, worst the last year  REFERRING PHYSICIAN: Esperanza Samuels MD MD Orders: Evaluate and Treat   Return MD Appointment: Not scheduled       Pre-treatment Symptoms/Complaints:  Pt. continues to report more pain when he first gets up then decreases as he starts moving around     Pain: Initial: Pain Intensity 1: 2  Pain Location 1: Back, Hip  Pain Orientation 1: Lateral, Lower, Right  Pain Intervention(s) 1: Cold pack, Exercise  Post Session: 1/10    Medications Last Reviewed:  2020  Updated Objective Findings: Pelvis level today    TREATMENT:   THERAPEUTIC EXERCISE: (55 minutes):  Exercises per grid below to improve mobility, strength, balance, and dynamic movement of back - generalized to improve functional bending, lifting, carrying, reaching, and overhead activites. Required moderate visual, verbal, and tactile cues to promote proper body alignment, promote proper body posture, promote proper body mechanics, and promote proper body breathing techniques. Progressed resistance, range, repetitions, and complexity of movement as indicated.      Date:  20 Date:  20 Date:  6/11/20   Activity/Exercise Parameters Parameters Parameters   Nustep X 10 mins level 5 X 10 mins level 5 x 10 mins level 6   Calf stretch wedge 4x30 sec hold 4x30 sec hold  [id number] hold    Lumbar rotation X 10 reps x 10 sec hold X 10 reps x 10 sec hold  X 10 reps x 10 sec hold    Hamstring stretch 4x30 sec hold strap Strap 4x30 sec hold  Strap 4x30 sec hold BLE's    Piriformis stretch 4x30 sec hold  4x30 sec hold  4x30 sec hold    IT Band stretch 6y16lse strap Strap 4x30 sec  Strap 4x30 sec hold   Pelvic tilts X 10 reps x 5 sec hold  X 20 reps 5 sec hold  X 20 reps 5 sec hold    SKTC 4x30 sec hold  4x30 sec hold  4x30 sec hold    Mini Squat Airex 3x10 airex 3x10 reps 3x10 reps airex    Crunch 2x10 3x10 reps  3x10   Bridging with abduction 3x10 blue 3x12 blue 3x10 Blue   Clam  Right left 3x10 blue 3x10 blue 3x10 reps blue band    Sidelying Hip abduction 2x10 blue 3x10 blue  3x10 blue   Isometric hip flexion  70a8wgn each X 20 reps 5 sec hold  X 20 reps 5 sec hold    Prone opp UE LE extension 2x10 reps  3x10 2x10  With 5 sec hold In quadraped    Lateral walking Blue  band 6 x 15 ft Blue  band 6 x 15 ft 4x15 blue band at knees   Monster walk  Blue  band 6 x 15 ft Blue band  Band 6x15 ft. Time spent with patient on correct form and muscle recruitment. MANUAL THERAPY: (0 minutes): Joint mobilization, Soft tissue mobilization was utilized and necessary because of the patient's restricted joint motion and restricted motion of soft tissue mobility. Pt. Received soft tissue mobilization to decrease pain and tightness in bilateral lumbosacral paraspinals in prone. No manual performed today.         Date  6/11/2020    Technique Used Grade  Level # Time(s) Effect while being performed   Central posterior anterior with prone III IV L2-5 2min Mild tenderness   Rotation   Lumbar 4min    Muscle energy technique  For anterior rotation right innominate   4min Improved gait and decreased pain       MODALITIES: ( minutes):      Not today     HEP: As above; handouts given to patient for all exercises. Treatment/Session Summary:    · Response to Treatment:  Pt. was compliant with all exercises and reported less pain  · Communication/Consultation:  None Today  · Equipment provided today:  None today  · Recommendations/Intent for next treatment session: Next visit will focus on Manual Therapy Core Stability Quad strengthening Hip strengthening soft tissue mobilization.     Total Treatment Billable Duration: 55 minutes   PT Patient Time In/Time Out  Time In: 0800  Time Out: 0900  Cindy Laughlin, PTA

## 2020-06-15 ENCOUNTER — APPOINTMENT (OUTPATIENT)
Dept: PHYSICAL THERAPY | Age: 55
End: 2020-06-15
Payer: OTHER GOVERNMENT

## 2020-06-15 ENCOUNTER — HOSPITAL ENCOUNTER (OUTPATIENT)
Dept: PHYSICAL THERAPY | Age: 55
Discharge: HOME | End: 2020-06-15
Payer: OTHER GOVERNMENT

## 2020-06-15 PROCEDURE — 97110 THERAPEUTIC EXERCISES: CPT

## 2020-06-15 NOTE — PROGRESS NOTES
Shane Aguirre  : 1965  Primary: [facility]  Secondary:  Therapy Center at [address], [address]  [phone]   [phone]      OUTPATIENT PHYSICAL THERAPY: Daily Treatment Note 6/15/2020  ICD-10: Treatment Diagnosis:   Lumbago with Sciatica Right side (M54.41)  Pain in left hip (M25.552)  Pain in right hip (M25.551)      Precautions: Congenital dyplasia bilateral hips  Allergies: Patient has no known allergies. TREATMENT PLAN:  Effective Dates: 2020 TO 2020 (60 days). 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.89]  Pain in right hip [M25.551]  Pain in left hip [M25.552]   DATE OF ONSET: Progressive symptoms for 20 years, worst the last year  REFERRING PHYSICIAN: General Adarsh MD MD Orders: Evaluate and Treat   Return MD Appointment: Not scheduled       Pre-treatment Symptoms/Complaints:  Patient reports progressing well, better in the AM and better with activity     Pain: Initial: Pain Intensity 1: 2  Pain Location 1: Back, Hip  Pain Orientation 1: Lower, Left, Right  Pain Intervention(s) 1: Cold pack, Exercise  Post Session: 1/10    Medications Last Reviewed:  6/15/2020  Updated Objective Findings: Equal pelvic landmarks    TREATMENT:   THERAPEUTIC EXERCISE: (53 minutes):  Exercises per grid below to improve mobility, strength, balance, and dynamic movement of back - generalized to improve functional bending, lifting, carrying, reaching, and overhead activites. Required moderate visual, verbal, and tactile cues to promote proper body alignment, promote proper body posture, promote proper body mechanics, and promote proper body breathing techniques. Progressed resistance, range, repetitions, and complexity of movement as indicated.      Date:  6/15/20 Date:  20  Date:  20 Activity/Exercise Parameters Parameters Parameters   Nustep X 10 mins level 6 X 10 mins level 5 x 10 mins level 6   Calf stretch wedge 4x30 sec hold 4x30 sec hold  [id number] hold    Lumbar rotation X 10 reps x 10 sec hold X 10 reps x 10 sec hold  X 10 reps x 10 sec hold    Hamstring stretch 4x30 sec hold strap Strap 4x30 sec hold  Strap 4x30 sec hold BLE's    Piriformis stretch 4x30 sec hold  4x30 sec hold  4x30 sec hold    IT Band stretch --- Strap 4x30 sec  Strap 4x30 sec hold   Pelvic tilts X 10 reps x 5 sec hold  X 20 reps 5 sec hold  X 20 reps 5 sec hold    SKTC 4x30 sec hold  4x30 sec hold  4x30 sec hold    Mini Squat Airex 3x10 airex 3x10 reps 3x10 reps airex    Crunch 3x10 3x10 reps  3x10   Bridging with abduction 3x10 blue 3x12 blue 3x10 Blue   Clam  Right left --- 3x10 blue 3x10 reps blue band    Sidelying Hip abduction --- 3x10 blue  3x10 blue   Isometric hip flexion  --- X 20 reps 5 sec hold  X 20 reps 5 sec hold    Dead Bug 2x10     Prone opp UE LE extension  2x10  With 5 sec hold In quadraped 3x10 2x10  With 5 sec hold In quadraped    Modified plank [id number] each     Modified side plank      Lateral walking Blue  band 6 x 15 ft Blue  band 6 x 15 ft 4x15 blue band at knees   Monster walk Blue  band 6 x 15 ft Blue  band 6 x 15 ft Blue band  Band 6x15 ft.    3 way hip swing Airex 2x10 red       Time spent with patient on correct form and muscle recruitment. MANUAL THERAPY: (0 minutes): Joint mobilization, Soft tissue mobilization was utilized and necessary because of the patient's restricted joint motion and restricted motion of soft tissue mobility. Pt. Received soft tissue mobilization to decrease pain and tightness in bilateral lumbosacral paraspinals in prone. No manual performed today.         Date  6/15/2020    Technique Used Grade  Level # Time(s) Effect while being performed   Central posterior anterior with prone III IV L2-5  Mild tenderness   Rotation   Lumbar     Muscle energy technique  For anterior rotation right innominate    Improved gait and decreased pain       MODALITIES: ( minutes):      Not today     HEP: As above; handouts given to patient for all exercises. Treatment/Session Summary:    · Response to Treatment:  Patient was progressed with core stability exercises without increased s/s  · Communication/Consultation:  None Today  · Equipment provided today:  None today  · Recommendations/Intent for next treatment session: Next visit will focus on Manual Therapy Core Stability Quad strengthening Hip strengthening soft tissue mobilization.     Total Treatment Billable Duration: 53 minutes   PT Patient Time In/Time Out  Time In: 1106  Time Out: [facility]

## 2020-06-18 ENCOUNTER — HOSPITAL ENCOUNTER (OUTPATIENT)
Dept: PHYSICAL THERAPY | Age: 55
Discharge: HOME | End: 2020-06-18
Payer: OTHER GOVERNMENT

## 2020-06-18 PROCEDURE — 97110 THERAPEUTIC EXERCISES: CPT

## 2020-06-18 PROCEDURE — 97014 ELECTRIC STIMULATION THERAPY: CPT

## 2020-06-18 NOTE — PROGRESS NOTES
Rajan Fraction  : 1965  Primary: [facility]  Secondary:  Therapy Center at [address], [place], [address] [place] [address]  [phone]   [phone]      OUTPATIENT PHYSICAL THERAPY: Daily Treatment Note 2020  ICD-10: Treatment Diagnosis:   Lumbago with Sciatica Right side (M54.41)  Pain in left hip (M25.552)  Pain in right hip (M25.551)      Precautions: Congenital dyplasia bilateral hips  Allergies: Patient has no known allergies. TREATMENT PLAN:  Effective Dates: 2020 TO 2020 (60 days). 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.89]  Pain in right hip [M25.551]  Pain in left hip [M25.552]   DATE OF ONSET: Progressive symptoms for 20 years, worst the last year  REFERRING PHYSICIAN: Silviano Haskins MD MD Orders: Evaluate and Treat   Return MD Appointment: Not scheduled       Pre-treatment Symptoms/Complaints:  Pt. continues to have pain and tightness that subsides after being up and active     Pain: Initial: Pain Intensity 1: 3  Pain Location 1: Back, Hip  Pain Orientation 1: Lower, Left, Right  Pain Intervention(s) 1: Cold pack, Exercise  Post Session: 1/10    Medications Last Reviewed:  2020  Updated Objective Findings: Mimimal antalgic gait noted    TREATMENT:   THERAPEUTIC EXERCISE: (40 minutes):  Exercises per grid below to improve mobility, strength, balance, and dynamic movement of back - generalized to improve functional bending, lifting, carrying, reaching, and overhead activites. Required moderate visual, verbal, and tactile cues to promote proper body alignment, promote proper body posture, promote proper body mechanics, and promote proper body breathing techniques. Progressed resistance, range, repetitions, and complexity of movement as indicated.      Date:  6/15/20 Date:  20 Date:  6/11/20   Activity/Exercise Parameters Parameters Parameters   Nustep X 10 mins level 6 X 10 mins level 6 x 10 mins level 6   Calf stretch wedge 4x30 sec hold 4x30 sec hold  [id number] hold    Lumbar rotation X 10 reps x 10 sec hold X 10 reps x 10 sec hold  X 10 reps x 10 sec hold    Hamstring stretch 4x30 sec hold strap Strap 4x30 sec hold  Strap 4x30 sec hold BLE's    Piriformis stretch 4x30 sec hold  4x30 sec hold  4x30 sec hold    IT Band stretch --- Strap 4x30 sec  Strap 4x30 sec hold   Pelvic tilts X 10 reps x 5 sec hold  X 20 reps 5 sec hold  X 20 reps 5 sec hold    SKTC 4x30 sec hold  4x30 sec hold  4x30 sec hold    Mini Squat Airex 3x10 airex 3x10 reps 3x10 reps airex    Crunch 3x10 3x10 reps  3x10   Bridging with abduction 3x10 blue 3x12 blue 3x10 Blue   Clam  Right left --- 3x10 blue 3x10 reps blue band    Sidelying Hip abduction --- 3x10 blue  3x10 blue   Isometric hip flexion  --- X 20 reps 5 sec hold  X 20 reps 5 sec hold    Dead Bug 2x10 2x10     Prone opp UE LE extension  2x10  With 5 sec hold In quadraped quadraped 2x10 reps 5 sec hold  2x10  With 5 sec hold In quadraped    Modified plank [id number] each 3x30 sec hold  each     Modified side plank  Knees bent bilateral 3x30 sec hold     Lateral walking Blue  band 6 x 15 ft Blue  band 6 x 15 ft 4x15 blue band at knees   Monster walk Blue  band 6 x 15 ft Blue  band 6 x 15 ft Blue band  Band 6x15 ft.    3 way hip swing Airex 2x10 red airex 2x10 red      Time spent with patient on correct form and muscle recruitment. MANUAL THERAPY: (0 minutes): Joint mobilization, Soft tissue mobilization was utilized and necessary because of the patient's restricted joint motion and restricted motion of soft tissue mobility. Pt. Received soft tissue mobilization to decrease pain and tightness in bilateral lumbosacral paraspinals in prone. No manual performed today.         Date  6/18/2020    Technique Used Grade  Level # Time(s) Effect while being performed Central posterior anterior with prone III IV L2-5  Mild tenderness   Rotation   Lumbar     Muscle energy technique  For anterior rotation right innominate    Improved gait and decreased pain       MODALITIES: (15 minutes):      Pt. received IFC estim and moist hot pack to bilateral low back in prone to decrease pain and stiffness     HEP: As above; handouts given to patient for all exercises. Treatment/Session Summary:    · Response to Treatment:  Pt. was compliant and reported feeling improvements from core strengthening  · Communication/Consultation:  None Today  · Equipment provided today:  None today  · Recommendations/Intent for next treatment session: Next visit will focus on Manual Therapy Core Stability Quad strengthening Hip strengthening soft tissue mobilization.     Total Treatment Billable Duration: 55 minutes   PT Patient Time In/Time Out  Time In: 0800  Time Out: 0900  Eric Jorgensen PTA

## 2020-06-22 ENCOUNTER — APPOINTMENT (OUTPATIENT)
Dept: PHYSICAL THERAPY | Age: 55
End: 2020-06-22
Payer: OTHER GOVERNMENT

## 2020-06-22 ENCOUNTER — HOSPITAL ENCOUNTER (OUTPATIENT)
Dept: PHYSICAL THERAPY | Age: 55
Discharge: HOME | End: 2020-06-22
Payer: OTHER GOVERNMENT

## 2020-06-22 PROCEDURE — 97110 THERAPEUTIC EXERCISES: CPT

## 2020-06-22 PROCEDURE — 97140 MANUAL THERAPY 1/> REGIONS: CPT

## 2020-06-22 NOTE — PROGRESS NOTES
Omar Rico  : 1965  Primary: [facility]  Secondary:  Therapy Center at [address], [address]  [phone]   [phone]      OUTPATIENT PHYSICAL THERAPY: Daily Treatment Note 2020  ICD-10: Treatment Diagnosis:   Lumbago with Sciatica Right side (M54.41)  Pain in left hip (M25.552)  Pain in right hip (M25.551)      Precautions: Congenital dyplasia bilateral hips  Allergies: Patient has no known allergies. TREATMENT PLAN:  Effective Dates: 2020 TO 2020 (60 days). 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.89]  Pain in right hip [M25.551]  Pain in left hip [M25.552]   DATE OF ONSET: Progressive symptoms for 20 years, worst the last year  REFERRING PHYSICIAN: Zion Doyle MD MD Orders: Evaluate and Treat   Return MD Appointment: Not scheduled       Pre-treatment Symptoms/Complaints:  Patient reports continued improvement and less AM stiffness     Pain: Initial: Pain Intensity 1: 3  Pain Location 1: Back, Hip  Pain Orientation 1: Lower, Left, Right  Pain Intervention(s) 1: Cold pack, Exercise  Post Session: 1/10    Medications Last Reviewed:  2020  Updated Objective Findings: Improved stability with activity    TREATMENT:   THERAPEUTIC EXERCISE: (45 minutes):  Exercises per grid below to improve mobility, strength, balance, and dynamic movement of back - generalized to improve functional bending, lifting, carrying, reaching, and overhead activites. Required moderate visual, verbal, and tactile cues to promote proper body alignment, promote proper body posture, promote proper body mechanics, and promote proper body breathing techniques. Progressed resistance, range, repetitions, and complexity of movement as indicated.      Date:  6/15/20 Date:  20 Date:  20 Activity/Exercise Parameters Parameters Parameters   Nustep X 10 mins level 6 X 10 mins level 6 x 10 mins level 6   Calf stretch wedge 4x30 sec hold 4x30 sec hold  [id number] hold    Lumbar rotation X 10 reps x 10 sec hold X 10 reps x 10 sec hold  X 10 reps x 10 sec hold    Hamstring stretch 4x30 sec hold strap Strap 4x30 sec hold  Strap 4x30 sec hold BLE's    Piriformis stretch 4x30 sec hold  4x30 sec hold  4x30 sec hold    IT Band stretch --- Strap 4x30 sec  Strap 4x30 sec hold   Pelvic tilts X 10 reps x 5 sec hold  X 20 reps 5 sec hold  X 20 reps 5 sec hold    SKTC 4x30 sec hold  4x30 sec hold  ---   Mini Squat Airex 3x10 airex 3x10 reps 3x12 reps airex    Crunch 3x10 3x10 reps  3x10   Bridging with abduction 3x10 blue 3x12 blue 2x10 single leg   Clam  Right left --- 3x10 blue 2x10 with side plank   Sidelying Hip abduction --- 3x10 blue  2x10 in side plank   Isometric hip flexion  --- X 20 reps 5 sec hold  ---   Dead Bug 2x10 2x10  3x10   Prone opp UE LE extension  2x10  With 5 sec hold In quadraped quadraped 2x10 reps 5 sec hold  2x10  With 5 sec hold In quadraped    Modified plank [id number] each 3x30 sec hold  each  4x30 sec hold  each    Modified side plank  Knees bent bilateral 3x30 sec hold     Lateral walking Blue  band 6 x 15 ft Blue  band 6 x 15 ft 4x15 blue band at ankles   Monster walk Blue  band 6 x 15 ft Blue  band 6 x 15 ft Blue band  Band 6x15 ft.    3 way hip swing Airex 2x10 red airex 2x10 red Airex 3x10 red     Time spent with patient on correct form and muscle recruitment. MANUAL THERAPY: (8 minutes): Joint mobilization, Soft tissue mobilization was utilized and necessary because of the patient's restricted joint motion and restricted motion of soft tissue mobility. Pt. Received soft tissue mobilization to decrease pain and tightness in bilateral lumbosacral paraspinals in prone. No manual performed today.         Date  6/22/2020    Technique Used Grade  Level # Time(s) Effect while being performed   Central posterior anterior with prone III IV L2-5 3min Mild tenderness   Rotation   Lumbar 3min    Muscle energy technique  For anterior rotation right innominate   2min Improved gait and decreased pain       MODALITIES: (0 minutes):      Pt. received IFC estim and moist hot pack to bilateral low back in prone to decrease pain and stiffness Not today    HEP: As above; handouts given to patient for all exercises. Treatment/Session Summary:    · Response to Treatment:  Progressed core stability without increased signs and symptoms  · Communication/Consultation:  None Today  · Equipment provided today:  None today  · Recommendations/Intent for next treatment session: Next visit will focus on Manual Therapy Core Stability Quad strengthening Hip strengthening soft tissue mobilization.     Total Treatment Billable Duration: 53 minutes   PT Patient Time In/Time Out  Time In: 0900  Time Out: 202 [facility], PT

## 2020-06-25 ENCOUNTER — HOSPITAL ENCOUNTER (OUTPATIENT)
Dept: PHYSICAL THERAPY | Age: 55
Discharge: HOME | End: 2020-06-25
Payer: OTHER GOVERNMENT

## 2020-06-25 PROCEDURE — 97110 THERAPEUTIC EXERCISES: CPT

## 2020-06-25 PROCEDURE — 97014 ELECTRIC STIMULATION THERAPY: CPT

## 2020-06-25 PROCEDURE — 97140 MANUAL THERAPY 1/> REGIONS: CPT

## 2020-06-25 NOTE — PROGRESS NOTES
Anu Hutton  : 1965  Primary: [facility]  Secondary:  Therapy Center at [address], [address]  [phone]   [phone]      OUTPATIENT PHYSICAL THERAPY: Daily Treatment Note 2020  ICD-10: Treatment Diagnosis:   Lumbago with Sciatica Right side (M54.41)  Pain in left hip (M25.552)  Pain in right hip (M25.551)      Precautions: Congenital dyplasia bilateral hips  Allergies: Patient has no known allergies. TREATMENT PLAN:  Effective Dates: 2020 TO 2020 (60 days). 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.89]  Pain in right hip [M25.551]  Pain in left hip [M25.552]   DATE OF ONSET: Progressive symptoms for 20 years, worst the last year  REFERRING PHYSICIAN: Stefanie Ramirez MD MD Orders: Evaluate and Treat   Return MD Appointment: Not scheduled       Pre-treatment Symptoms/Complaints:  Patient reports overall better with activity and sleeping, some increase in soreness last night     Pain: Initial: Pain Intensity 1: 4  Pain Location 1: Back, Hip  Pain Orientation 1: Lower, Right, Left  Pain Intervention(s) 1: Cold pack, Exercise  Post Session: 2/10    Medications Last Reviewed:  2020  Updated Objective Findings: Anterior rotation right innominate    TREATMENT:   THERAPEUTIC EXERCISE: (32 minutes):  Exercises per grid below to improve mobility, strength, balance, and dynamic movement of back - generalized to improve functional bending, lifting, carrying, reaching, and overhead activites. Required moderate visual, verbal, and tactile cues to promote proper body alignment, promote proper body posture, promote proper body mechanics, and promote proper body breathing techniques. Progressed resistance, range, repetitions, and complexity of movement as indicated.      Date:  20 Date:  6/18/20 Date:  6/22/20   Activity/Exercise Parameters Parameters Parameters   Nustep X 10 mins level 6 X 10 mins level 6 x 10 mins level 6   Calf stretch wedge 4x30 sec hold 4x30 sec hold  [id number] hold    Lumbar rotation X 10 reps x 10 sec hold X 10 reps x 10 sec hold  X 10 reps x 10 sec hold    Hamstring stretch 4x30 sec hold strap Strap 4x30 sec hold  Strap 4x30 sec hold BLE's    Piriformis stretch 4x30 sec hold  4x30 sec hold  4x30 sec hold    IT Band stretch --- Strap 4x30 sec  Strap 4x30 sec hold   Pelvic tilts X 10 reps x 5 sec hold  X 20 reps 5 sec hold  X 20 reps 5 sec hold    SKTC 4x30 sec hold  4x30 sec hold  ---   Mini Squat  airex 3x10 reps 3x12 reps airex    Crunch 3x10 3x10 reps  3x10   Bridging with abduction 2x10 single leg 3x12 blue 2x10 single leg   Clam  Right left --- 3x10 blue 2x10 with side plank   Sidelying Hip abduction --- 3x10 blue  2x10 in side plank   Isometric hip flexion  --- X 20 reps 5 sec hold  ---   Dead Bug 3x10 2x10  3x10   Prone opp UE LE extension --- quadraped 2x10 reps 5 sec hold  2x10  With 5 sec hold In quadraped    Modified plank  3x30 sec hold  each  4x30 sec hold  each    Modified side plank  Knees bent bilateral 3x30 sec hold     Lateral walking --- Blue  band 6 x 15 ft 4x15 blue band at ankles   Monster walk --- Blue  band 6 x 15 ft Blue band  Band 6x15 ft.    3 way hip swing --- airex 2x10 red Airex 3x10 red     Time spent with patient on correct form and muscle recruitment. MANUAL THERAPY: (13 minutes): Joint mobilization, Soft tissue mobilization was utilized and necessary because of the patient's restricted joint motion and restricted motion of soft tissue mobility. Pt. Received soft tissue mobilization to decrease pain and tightness in bilateral lumbosacral paraspinals in prone. No manual performed today.         Date  6/25/2020    Technique Used Grade  Level # Time(s) Effect while being performed   Central posterior anterior with prone III IV L2-5 3min Mild tenderness   Rotation   Lumbar 3min    Right hip mobilization III  3min    Muscle energy technique  For anterior rotation right innominate   4min Improved gait and decreased pain       MODALITIES: (10 minutes):      Pt. received IFC estim and moist hot pack to bilateral low back in prone to decrease pain and stiffness     HEP: As above; handouts given to patient for all exercises. Treatment/Session Summary:    · Response to Treatment:  Modified treatment and increased joint mobilizations secondary to subjective complaints and R SI pain  · Communication/Consultation:  None Today  · Equipment provided today:  None today  · Recommendations/Intent for next treatment session: Next visit will focus on Manual Therapy Core Stability Quad strengthening Hip strengthening soft tissue mobilization.     Total Treatment Billable Duration: 55 minutes   PT Patient Time In/Time Out  Time In: 0803  Time Out: 216 [facility], PT

## 2020-06-29 ENCOUNTER — APPOINTMENT (OUTPATIENT)
Dept: PHYSICAL THERAPY | Age: 55
End: 2020-06-29
Payer: OTHER GOVERNMENT

## 2020-07-02 ENCOUNTER — HOSPITAL ENCOUNTER (OUTPATIENT)
Dept: PHYSICAL THERAPY | Age: 55
Discharge: HOME | End: 2020-07-02
Payer: OTHER GOVERNMENT

## 2020-07-02 PROCEDURE — 97110 THERAPEUTIC EXERCISES: CPT

## 2020-07-02 PROCEDURE — 97140 MANUAL THERAPY 1/> REGIONS: CPT

## 2020-07-02 NOTE — PROGRESS NOTES
Matias Salguero  : 1965  Primary: [facility]  Secondary:  Therapy Center at [address], [address] [place] [address]  [phone]   [phone]      OUTPATIENT PHYSICAL THERAPY: Daily Treatment Note 2020  ICD-10: Treatment Diagnosis:   Lumbago with Sciatica Right side (M54.41)  Pain in left hip (M25.552)  Pain in right hip (M25.551)      Precautions: Congenital dyplasia bilateral hips  Allergies: Patient has no known allergies. TREATMENT PLAN:  Effective Dates: 2020 TO 2020 (60 days). 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.89]  Pain in right hip [M25.551]  Pain in left hip [M25.552]   DATE OF ONSET: Progressive symptoms for 20 years, worst the last year  REFERRING PHYSICIAN: Margoth Ashley MD MD Orders: Evaluate and Treat   Return MD Appointment: Not scheduled       Pre-treatment Symptoms/Complaints:  Patient reports doing much better after last week, just min left sided pain at 5 AM last night. Pain: Initial: Pain Intensity 1: 1  Pain Location 1: Back, Hip  Pain Orientation 1: Lower, Left, Right  Pain Intervention(s) 1: Exercise  Post Session: 1/10    Medications Last Reviewed:  2020  Updated Objective Findings: equal pelvic landmarks    TREATMENT:   THERAPEUTIC EXERCISE: (44 minutes):  Exercises per grid below to improve mobility, strength, balance, and dynamic movement of back - generalized to improve functional bending, lifting, carrying, reaching, and overhead activites. Required moderate visual, verbal, and tactile cues to promote proper body alignment, promote proper body posture, promote proper body mechanics, and promote proper body breathing techniques. Progressed resistance, range, repetitions, and complexity of movement as indicated.      Date:  20 Date:  20 Date:  6/22/20   Activity/Exercise Parameters Parameters Parameters   Nustep X 10 mins level 6 X 10 mins level 6 x 10 mins level 6   Calf stretch wedge 4x30 sec hold 4x30 sec hold  [id number] hold    Lumbar rotation X 10 reps x 10 sec hold X 10 reps x 10 sec hold  X 10 reps x 10 sec hold    Hamstring stretch 4x30 sec hold strap Strap 4x30 sec hold  Strap 4x30 sec hold BLE's    Piriformis stretch 4x30 sec hold  4x30 sec hold  4x30 sec hold    IT Band stretch --- Strap 4x30 sec  Strap 4x30 sec hold   Pelvic tilts X 10 reps x 5 sec hold  X 20 reps 5 sec hold  X 20 reps 5 sec hold    SKTC 4x30 sec hold  4x30 sec hold  ---   Mini Squat  --- 3x12 reps airex    Crunch 3x10 3x10 reps  3x10   Bridging with abduction 2x10 single leg 2x10 single leg 2x10 single leg   Clam  Right left --- --- 2x10 with side plank   Sidelying Hip abduction --- --- 2x10 in side plank   Isometric hip flexion  --- --- ---   Dead Bug 3x10 2x10  3x10   Prone opp UE LE extension --- quadraped 2x10 reps 5 sec hold  2x10  With 5 sec hold In quadraped    Modified plank  3x30 sec hold  each full 4x30 sec hold  each    Modified side plank  Knees bent bilateral w/ clam and Abduction 2x10    Lateral walking --- Black  band 4 x 20 ft 4x15 blue band at ankles   Monster walk --- Black  band 4 x 20 ft Blue band  Band 6x15 ft.    3 way hip swing --- airex 10x 5 sec hold Airex 3x10 red     Time spent with patient on correct form and muscle recruitment. MANUAL THERAPY: (10 minutes): Joint mobilization, Soft tissue mobilization was utilized and necessary because of the patient's restricted joint motion and restricted motion of soft tissue mobility. Pt. Received soft tissue mobilization to decrease pain and tightness in bilateral lumbosacral paraspinals in prone.          Date  7/2/2020    Technique Used Grade  Level # Time(s) Effect while being performed   Central posterior anterior with prone III IV L2-5 3min Mild tenderness   Rotation   Lumbar 4min    Right hip mobilization III  3min    Muscle energy technique  For anterior rotation right innominate   0min Improved gait and decreased pain       MODALITIES: (0 minutes):      None today     HEP: As above; handouts given to patient for all exercises. Treatment/Session Summary:    · Response to Treatment:  Good tolerance to increased strengthening  · Communication/Consultation:  None Today  · Equipment provided today:  None today  · Recommendations/Intent for next treatment session: Next visit will focus on Manual Therapy Core Stability Quad strengthening Hip strengthening soft tissue mobilization.     Total Treatment Billable Duration: 54 minutes   PT Patient Time In/Time Out  Time In: 0802  Time Out: 216 [facility],

## 2020-07-06 ENCOUNTER — HOSPITAL ENCOUNTER (OUTPATIENT)
Dept: PHYSICAL THERAPY | Age: 55
Discharge: HOME | End: 2020-07-06
Payer: OTHER GOVERNMENT

## 2020-07-06 ENCOUNTER — APPOINTMENT (OUTPATIENT)
Dept: PHYSICAL THERAPY | Age: 55
End: 2020-07-06
Payer: OTHER GOVERNMENT

## 2020-07-06 PROCEDURE — 97110 THERAPEUTIC EXERCISES: CPT

## 2020-07-06 NOTE — PROGRESS NOTES
Grace Gonsalez  : 1965  Primary: [facility]  Secondary:  Therapy Center at [address], [address]  [phone]   [id number]      OUTPATIENT PHYSICAL THERAPY: Daily Treatment Note 2020  ICD-10: Treatment Diagnosis:   Lumbago with Sciatica Right side (M54.41)  Pain in left hip (M25.552)  Pain in right hip (M25.551)      Precautions: Congenital dyplasia bilateral hips  Allergies: Patient has no known allergies. TREATMENT PLAN:  Effective Dates: 2020 TO 2020 (60 days). Frequency/Duration: Discharge MEDICAL/REFERRING DIAGNOSIS:  Lumbago with sciatica, right side [M54.41]  Other specified congenital deformities of hip [Q65.89]  Pain in right hip [M25.551]  Pain in left hip [M25.552]   DATE OF ONSET: Progressive symptoms for 20 years, worst the last year  REFERRING PHYSICIAN: Myles Suggs MD MD Orders: Evaluate and Treat   Return MD Appointment: Not scheduled       Pre-treatment Symptoms/Complaints:  Please See Discharge Summary dated 2020 for more details. Pain: Initial: Pain Intensity 1: 1  Pain Location 1: Back, Hip  Pain Orientation 1: Lower, Left, Right  Pain Intervention(s) 1: Exercise  Post Session: 1/10    Medications Last Reviewed:  2020  Updated Objective Findings: Please See Discharge Summary date 2020 for more details. TREATMENT:   THERAPEUTIC EXERCISE: (53 minutes):  Exercises per grid below to improve mobility, strength, balance, and dynamic movement of back - generalized to improve functional bending, lifting, carrying, reaching, and overhead activites. Required moderate visual, verbal, and tactile cues to promote proper body alignment, promote proper body posture, promote proper body mechanics, and promote proper body breathing techniques. Progressed resistance, range, repetitions, and complexity of movement as indicated.      Date:  20 Date:  20 Date:  20 Activity/Exercise Parameters Parameters Parameters   Nustep X 10 mins level 6 X 10 mins level 6 x 10 mins level 6   Calf stretch wedge 4x30 sec hold 4x30 sec hold  [id number] hold    Lumbar rotation X 10 reps x 10 sec hold X 10 reps x 10 sec hold  X 10 reps x 10 sec hold    Hamstring stretch 4x30 sec hold strap Strap 4x30 sec hold  Strap 4x30 sec hold BLE's    Piriformis stretch 4x30 sec hold  4x30 sec hold  4x30 sec hold    IT Band stretch --- Strap 4x30 sec  Strap 4x30 sec hold   Pelvic tilts X 10 reps x 5 sec hold  X 20 reps 5 sec hold  X 20 reps 5 sec hold    SKTC 4x30 sec hold  4x30 sec hold  ---   Mini Squat  --- 3x12 reps airex    Crunch 3x10 3x10 reps  3x10   Bridging with abduction 2x10 single leg 2x10 single leg 2x10 single leg   Clam  Right left --- --- 2x10 with side plank   Sidelying Hip abduction --- --- 2x10 in side plank   Isometric hip flexion  --- --- ---   Dead Bug 3x10 2x10  3x10   Prone opp UE LE extension --- quadraped 2x10 reps 5 sec hold  2x10  With 5 sec hold In quadraped    Modified plank  3x30 sec hold  each full 4x30 sec hold  each full   Modified side plank  Knees bent bilateral w/ clam and Abduction 2x10 Knees bent bilateral w/ clam and Abduction 2x10 blue   Lateral walking --- Black  band 4 x 20 ft 4x15 blue band at ankles   Monster walk --- Black  band 4 x 20 ft Blue band  Band 6x15 ft.    3 way hip swing --- airex 10x 5 sec hold Airex 3x10 blue     Time spent with patient on correct form and muscle recruitment. MANUAL THERAPY: (0 minutes): Joint mobilization, Soft tissue mobilization was utilized and necessary because of the patient's restricted joint motion and restricted motion of soft tissue mobility. Pt. Received soft tissue mobilization to decrease pain and tightness in bilateral lumbosacral paraspinals in prone.          Date  7/6/2020    Technique Used Grade  Level # Time(s) Effect while being performed   Central posterior anterior with prone III IV L2-5  Mild tenderness Rotation   Lumbar     Right hip mobilization III      Muscle energy technique  For anterior rotation right innominate    Improved gait and decreased pain       MODALITIES: (0 minutes):      None today     HEP: As above; handouts given to patient for all exercises. Treatment/Session Summary:    · Response to Treatment:  Please see Discharge Summary dated 7/6/2020 for more details. · Communication/Consultation:  None Today  · Equipment provided today:  None today  · Recommendations/Intent for next treatment session: Next visit will focus on Discharge to [facility].     Total Treatment Billable Duration: 53 minutes   PT Patient Time In/Time Out  Time In: 0900  Time Out: [facility], PT

## 2020-07-06 NOTE — THERAPY DISCHARGE
Lloyd Bryson  : 1965  Primary: [facility]  Secondary:  Therapy Center at [address], [address]  [phone]   [phone]          OUTPATIENT PHYSICAL [address] 2020      ICD-10: Treatment Diagnosis:   Lumbago with Sciatica Right side (M54.41)  Pain in left hip (M25.552)  Pain in right hip (M25.551)         Precautions: Congenital dyplasia bilateral hips  Allergies: Patient has no known allergies. TREATMENT PLAN:  Effective Dates: 2020 TO 2020 (60 days). Frequency/Duration: Discharge MEDICAL/REFERRING DIAGNOSIS:  Lumbago with sciatica, right side [M54.41]  Other specified congenital deformities of hip [Q65.89]  Pain in right hip [M25.551]  Pain in left hip [M25.552]   DATE OF ONSET: Progressive symptoms for 20 years, worst the last year  REFERRING PHYSICIAN: Tal Villeda MD MD Orders: Evaluate and Treat   Return MD Appointment: Not scheduled     INITIAL ASSESSMENT:  Mr. Immanuel Turner is a 49 y.o. male presenting to physical therapy with complaints of low back and bilateral hip pain right greater than left. Patient is returning to physical therapy after having to be discharged secondary to COVID-19 mandates, he states that he was doing better until limited HEP secondary to work and changes in activity. Patient is motivated to decrease pain and improve overall mobility. Patient presents with increased pain, decreased strength, decreased ROM, decreased flexibility, decreased activity tolerance, and overall impaired functional mobility. Patient is a good candidate for skilled intervention with services to include manual therapy, modalities as needed, therapeutic exercises, and activity modification. DISCHARGE NOTE (2020):  Patient has been seen for 16 sessions of physical therapy from 2020 to 2020. Patient reports feeling 90% better with with pain and tolerance to activity.  Patient has met most goals for physical therapy and is independent with comprehensive HEP for core and hip strength and stability. PROBLEM LIST (Impacting functional limitations):  1. Decreased Strength  2. Decreased ADL/Functional Activities  3. Decreased Transfer Abilities  4. Decreased Ambulation Ability/Technique  5. Decreased Balance  6. Increased Pain  7. Decreased Activity Tolerance  8. Increased Fatigue  9. Increased Shortness of Breath  10. Decreased Flexibility/Joint Mobility  11. Decreased [place] with Home Exercise Program INTERVENTIONS PLANNED:  1. Balance Exercise  2. Bed Mobility  3. Cold  4. Cryotherapy  5. Electrical Stimulation  6. Family Education  7. Gait Training  8. Heat  9. Home Exercise Program (HEP)  10. Manual Therapy  11. Neuromuscular Re-education/Strengthening  12. Range of Motion (ROM)  13. Therapeutic Activites  14. Therapeutic Exercise/Strengthening  15. Transfer Training  16. Lumbar Traction     GOALS: (Goals have been discussed and agreed upon with patient.)     Short-Term Functional Goals: Time Frame: 5/11/2020 to 6/11/2020 (30 days)  Goal Met   1. Manny Chi will be independent with HEP 1. [x] Date: 6/1/2020   2. Manny Chi will participate in LE stretching program to increase flexibility    2. [x] Date:  6/1/2020   3. Manny Chi will participate in core stabilization exercises to help with stabilization during ADLs 3. [x] Date:  6/1/2020   4. Manny Chi will participate in LE strengthening program with weights/resistance as appropriate to help with gait and elevations 4. [x] Date:  6/1/2020   5. Manny Chi will be educated in and demonstrate proper squat lift technique in order to reduce stress on spine and pelvis, improve safety, and reduce risk of injury. 5.  [x] Date:  6/1/2020   6. Manny Chi will tolerate manual therapy/joint mobilizations/soft tissue to increase ROM and decrease pain  6.   [x] Date:  6/1/2020 Discharge Goals: Time Frame: 5/11/2020 to 7/11/2020 (60 days) Goal Met   1. John Alvarez will improve pain to 1-2/10 for improved tolerance to sleeping greater than 6 hours 1. [x] Date: 7/6/2020   2. John Alvarez will demonstrate 4+ to5/5 LE strength on manual muscle testing for ability to perform yard work required around the home 2. [x] Date: 7/6/2020   3. John Alvarez will improve lumbar flexion ROM to 70 degrees for increased ability to perform bending, lifting and squating 3. [x] Date: 7/6/2020   4. John Alvarze will improve Modified Oswestry Low Back Pain Questionnaire to 10/50 to demonstrate increased tolerance to activity. 4.  [] Date: Not Met          Outcome Measure: Tool Used: Modified Oswestry Low Back Pain Questionnaire  Score:  Initial: 27/50  Most Recent: 16/50 (Date: 7/6/2020 )   Interpretation of Score: Each section is scored on a 0-5 scale, 5 representing the greatest disability. The scores of each section are added together for a total score of 50.           Observation/Orthostatic Postural Assessment:           Decreased Lumbar Lordosis   Palpation:          Mild Tenderness with palpation of  Spinous process L2-5 and anterior right hip     ROM:            AROM/PROM         Joint: Eval Date: 6/1/2020  Re-Assess Date: 6/1/2020  Re-Assess Date:    Active ROM RIGHT LEFT RIGHT LEFT RIGHT LEFT   Knee Extension WNL WNL WNL WNL WNL WNL   Knee Flexion WNL WNL WNL WNL WNL WNL   Hip Flexion 93 105 100 105 105 110   Hip Abduction 25 34 30 35 35 35   Hip External rotation 35 55 42 55 48 55   Hip Internal rotation 5 18 10 18 15 22   Lumbar Flexion 50  57  70    Lumbar Extension 13  15  20      Repeated Motion:  Direction    Frequency Symptoms Prior Symptons Post   Flexion Repeated 10 times  No Symptoms   Extension Repeated 10 times  No Symptoms         Strength:     Eval Date: 6/1/2020  Re-Assess Date: 6/1/2020  Re-Assess Date: 7/6/2020      RIGHT LEFT RIGHT LEFT RIGHT LEFT   Knee Flexion (L5-S2)   4+/5  5/5 4+/5 5/5 5/5 5/5   Knee Extension (L3, L4)  5/5  5/5 5/5 5/5 5/5 5/5   Hip Flexion (L1, L2)  4/5  4+/5 4+/5 4+/5 5/5 5/5   Hip Extension  4/5  4+/5 4+/5 4+/5 5/5 5/5   Hip Abduction (L5, S1)  4/5  4+/5 4/5 4+/5 4+/5 5/5   Ankle Dorsiflexion (L4)  5/5  5/5 5/5 5/5 5/5 5/5   Great Toe Extension (L5)  5/5  5/5 5/5 5/5 5/5 5/5   Ankle Plantar Flexion (S1-S2)  5/5  5/5 5/5 5/5 5/5 5/5       Special Tests:  SLR: Negative  SLUMP: Negative  Scouring:Negative for impingement pain  Fabers:Positive        Manual:   Eval Date: 6/1/2020   Reasess Date: 7/6/2020      Joint/Area Directon Grade Treatment Effect Joint Direction Grade Treatment Effect   Lumbar 2-5 PA III and IV Improved Symptoms post treatment Lumbar 2-5 PA III and IV Improved Symptoms post treatment   Right hip Distraction III Improved Symptoms post treatment Right Hip Distration III and IV Improved Symptoms post treatment               Neurological Screen:    RADIATING SYMPTOMS?: No      Upper motor [place] screen         Clonus: Negative         Babinski: Negative    Functional Mobility:  Normal ADL's. Balance:     Single Leg Stance: R= <30 seconds L= <30 seconds           Reason for Services/Other Comments:  Patient has been seen for 16 sessions of physical therapy from 5/11/2020 to 7/6/2020. Patient reports feeling 90% better with with pain and tolerance to activity. Patient has met most goals for physical therapy and is independent with comprehensive HEP for core and hip strength and stability. Regarding Dillon Carlson therapy, I certify that the treatment plan above will be carried out by a therapist or under their direction.   Thank you for this referral,  Steffany Coronado, PT MSR